# Patient Record
Sex: MALE | Race: BLACK OR AFRICAN AMERICAN | NOT HISPANIC OR LATINO | Employment: FULL TIME | URBAN - METROPOLITAN AREA
[De-identification: names, ages, dates, MRNs, and addresses within clinical notes are randomized per-mention and may not be internally consistent; named-entity substitution may affect disease eponyms.]

---

## 2017-07-28 ENCOUNTER — OFFICE VISIT (OUTPATIENT)
Dept: INTERNAL MEDICINE | Facility: CLINIC | Age: 42
End: 2017-07-28

## 2017-07-28 VITALS
WEIGHT: 267.25 LBS | HEIGHT: 71 IN | SYSTOLIC BLOOD PRESSURE: 124 MMHG | BODY MASS INDEX: 37.41 KG/M2 | HEART RATE: 64 BPM | RESPIRATION RATE: 16 BRPM | TEMPERATURE: 97.2 F | DIASTOLIC BLOOD PRESSURE: 70 MMHG

## 2017-07-28 DIAGNOSIS — Z00.00 WELL ADULT EXAM: Primary | ICD-10-CM

## 2017-07-28 DIAGNOSIS — Z13.220 LIPID SCREENING: ICD-10-CM

## 2017-07-28 DIAGNOSIS — Z12.5 SCREENING PSA (PROSTATE SPECIFIC ANTIGEN): ICD-10-CM

## 2017-07-28 DIAGNOSIS — J30.2 SEASONAL ALLERGIC RHINITIS, UNSPECIFIED ALLERGIC RHINITIS TRIGGER: ICD-10-CM

## 2017-07-28 DIAGNOSIS — J30.1 SEASONAL ALLERGIC RHINITIS DUE TO POLLEN: ICD-10-CM

## 2017-07-28 DIAGNOSIS — J45.20 MILD INTERMITTENT ASTHMA WITHOUT COMPLICATION: ICD-10-CM

## 2017-07-28 LAB
ALBUMIN SERPL-MCNC: 4.6 G/DL (ref 3.2–4.8)
ALBUMIN/GLOB SERPL: 1.5 G/DL (ref 1.5–2.5)
ALP SERPL-CCNC: 90 U/L (ref 25–100)
ALT SERPL W P-5'-P-CCNC: 39 U/L (ref 7–40)
ANION GAP SERPL CALCULATED.3IONS-SCNC: 4 MMOL/L (ref 3–11)
ARTICHOKE IGE QN: 107 MG/DL (ref 0–130)
AST SERPL-CCNC: 22 U/L (ref 0–33)
BILIRUB SERPL-MCNC: 0.6 MG/DL (ref 0.3–1.2)
BUN BLD-MCNC: 14 MG/DL (ref 9–23)
BUN/CREAT SERPL: 14 (ref 7–25)
CALCIUM SPEC-SCNC: 9.3 MG/DL (ref 8.7–10.4)
CHLORIDE SERPL-SCNC: 105 MMOL/L (ref 99–109)
CHOLEST SERPL-MCNC: 165 MG/DL (ref 0–200)
CO2 SERPL-SCNC: 29 MMOL/L (ref 20–31)
CREAT BLD-MCNC: 1 MG/DL (ref 0.6–1.3)
DEPRECATED RDW RBC AUTO: 43.4 FL (ref 37–54)
ERYTHROCYTE [DISTWIDTH] IN BLOOD BY AUTOMATED COUNT: 13.1 % (ref 11.3–14.5)
GFR SERPL CREATININE-BSD FRML MDRD: 98 ML/MIN/1.73
GLOBULIN UR ELPH-MCNC: 3.1 GM/DL
GLUCOSE BLD-MCNC: 94 MG/DL (ref 70–100)
HCT VFR BLD AUTO: 46 % (ref 38.9–50.9)
HDLC SERPL-MCNC: 38 MG/DL (ref 40–60)
HGB BLD-MCNC: 15 G/DL (ref 13.1–17.5)
MCH RBC QN AUTO: 29.4 PG (ref 27–31)
MCHC RBC AUTO-ENTMCNC: 32.6 G/DL (ref 32–36)
MCV RBC AUTO: 90 FL (ref 80–99)
PLATELET # BLD AUTO: 203 10*3/MM3 (ref 150–450)
PMV BLD AUTO: 9.7 FL (ref 6–12)
POTASSIUM BLD-SCNC: 4.1 MMOL/L (ref 3.5–5.5)
PROT SERPL-MCNC: 7.7 G/DL (ref 5.7–8.2)
PSA SERPL-MCNC: 0.56 NG/ML (ref 0–4)
RBC # BLD AUTO: 5.11 10*6/MM3 (ref 4.2–5.76)
SODIUM BLD-SCNC: 138 MMOL/L (ref 132–146)
T4 FREE SERPL-MCNC: 0.98 NG/DL (ref 0.89–1.76)
TRIGL SERPL-MCNC: 116 MG/DL (ref 0–150)
TSH SERPL DL<=0.05 MIU/L-ACNC: 2 MIU/ML (ref 0.35–5.35)
WBC NRBC COR # BLD: 5.48 10*3/MM3 (ref 3.5–10.8)

## 2017-07-28 PROCEDURE — 36415 COLL VENOUS BLD VENIPUNCTURE: CPT | Performed by: INTERNAL MEDICINE

## 2017-07-28 PROCEDURE — 99396 PREV VISIT EST AGE 40-64: CPT | Performed by: INTERNAL MEDICINE

## 2017-07-28 PROCEDURE — 80053 COMPREHEN METABOLIC PANEL: CPT | Performed by: INTERNAL MEDICINE

## 2017-07-28 PROCEDURE — 84439 ASSAY OF FREE THYROXINE: CPT | Performed by: INTERNAL MEDICINE

## 2017-07-28 PROCEDURE — 84443 ASSAY THYROID STIM HORMONE: CPT | Performed by: INTERNAL MEDICINE

## 2017-07-28 PROCEDURE — 80061 LIPID PANEL: CPT | Performed by: INTERNAL MEDICINE

## 2017-07-28 PROCEDURE — 84153 ASSAY OF PSA TOTAL: CPT | Performed by: INTERNAL MEDICINE

## 2017-07-28 PROCEDURE — 85027 COMPLETE CBC AUTOMATED: CPT | Performed by: INTERNAL MEDICINE

## 2017-07-28 RX ORDER — BUDESONIDE AND FORMOTEROL FUMARATE DIHYDRATE 160; 4.5 UG/1; UG/1
2 AEROSOL RESPIRATORY (INHALATION) 2 TIMES DAILY
Qty: 1 INHALER | Refills: 6 | Status: SHIPPED | OUTPATIENT
Start: 2017-07-28 | End: 2019-10-09 | Stop reason: SDUPTHER

## 2017-07-28 RX ORDER — FLUTICASONE PROPIONATE 50 MCG
2 SPRAY, SUSPENSION (ML) NASAL DAILY
Qty: 1 EACH | Refills: 5 | Status: SHIPPED | OUTPATIENT
Start: 2017-07-28 | End: 2019-10-09 | Stop reason: SDUPTHER

## 2017-07-28 RX ORDER — ALBUTEROL SULFATE 90 UG/1
2 AEROSOL, METERED RESPIRATORY (INHALATION) EVERY 4 HOURS PRN
Qty: 1 INHALER | Refills: 3 | Status: SHIPPED | OUTPATIENT
Start: 2017-07-28 | End: 2019-10-09 | Stop reason: SDUPTHER

## 2017-07-28 RX ORDER — MONTELUKAST SODIUM 10 MG/1
10 TABLET ORAL DAILY
Qty: 30 TABLET | Refills: 6 | Status: SHIPPED | OUTPATIENT
Start: 2017-07-28 | End: 2019-10-09 | Stop reason: SDUPTHER

## 2017-07-28 NOTE — PROGRESS NOTES
Subjective   Pj Zuleta is a 42 y.o. male.     History of Present Illness     Complete Physical   1 asthma-chronic and controlled.  Patient says that he has been doing very well in regards to his asthma.  No reports of any exercise intolerance, dyspnea on exertion, shortness breath, wheezing, coughing, or any other systemic symptoms.    2 seasonal allergies- chronic and controlled.  Patient says that his seasonal allergies have been well-controlled since being on the Singulair and Flonase r    Diet: Regular    Exercise minimal    Social History no EtOH consumption, no serious smoking, no IV drug use, no marijuana.      Review of Systems   All other systems reviewed and are negative.      Objective   Physical Exam   Constitutional: He is oriented to person, place, and time. He appears well-developed and well-nourished.   HENT:   Head: Normocephalic.   Right Ear: External ear normal.   Left Ear: External ear normal.   Nose: Nose normal.   Mouth/Throat: Oropharynx is clear and moist.   Eyes: Conjunctivae and EOM are normal. Pupils are equal, round, and reactive to light.   Neck: Normal range of motion. Neck supple.   Cardiovascular: Normal rate, regular rhythm and normal heart sounds.    Pulmonary/Chest: Effort normal and breath sounds normal.   Abdominal: Soft.   Genitourinary: Rectum normal, prostate normal and penis normal.   Musculoskeletal: Normal range of motion.   Neurological: He is alert and oriented to person, place, and time. He has normal reflexes.   Skin: Skin is warm.   Psychiatric: He has a normal mood and affect. His behavior is normal. Judgment and thought content normal.   Nursing note and vitals reviewed.      Assessment/Plan   Pj was seen today for annual exam, asthma and allergies.    Diagnoses and all orders for this visit:    Well adult exam  -     CBC (No Diff)  -     Comprehensive Metabolic Panel  -     T4, Free  -     TSH    Lipid screening  -     Lipid Panel    Mild intermittent  asthma without complication  -     budesonide-formoterol (SYMBICORT) 160-4.5 MCG/ACT inhaler; Inhale 2 puffs 2 (Two) Times a Day.  -     albuterol (PROVENTIL HFA;VENTOLIN HFA) 108 (90 BASE) MCG/ACT inhaler; Inhale 2 puffs Every 4 (Four) Hours As Needed for Wheezing.    Seasonal allergic rhinitis, unspecified allergic rhinitis trigger    Seasonal allergic rhinitis due to pollen  -     montelukast (SINGULAIR) 10 MG tablet; Take 1 tablet by mouth Daily.  -     fluticasone (FLONASE) 50 MCG/ACT nasal spray; 2 sprays into each nostril Daily.    Screening PSA (prostate specific antigen)  -     PSA    Continue on current inhaler therapy for asthma.  Continue on current medication therapy for seasonal allergies.      Anticipatory guidance:  Colonoscopy for colon cancer screening starting at age 45 (new recommendations for -American patients)  Recommend routine ophthalmology exam.  Recommend routine dental visits.

## 2018-05-03 ENCOUNTER — HOSPITAL ENCOUNTER (EMERGENCY)
Facility: HOSPITAL | Age: 43
Discharge: HOME | End: 2018-05-03
Attending: EMERGENCY MEDICINE | Admitting: EMERGENCY MEDICINE

## 2018-05-03 ENCOUNTER — APPOINTMENT (OUTPATIENT)
Dept: GENERAL RADIOLOGY | Facility: HOSPITAL | Age: 43
End: 2018-05-03

## 2018-05-03 VITALS
DIASTOLIC BLOOD PRESSURE: 77 MMHG | RESPIRATION RATE: 18 BRPM | HEIGHT: 72 IN | HEART RATE: 92 BPM | WEIGHT: 260 LBS | OXYGEN SATURATION: 96 % | SYSTOLIC BLOOD PRESSURE: 139 MMHG | TEMPERATURE: 99.9 F | BODY MASS INDEX: 35.21 KG/M2

## 2018-05-03 DIAGNOSIS — R50.9 FEVER, UNSPECIFIED FEVER CAUSE: ICD-10-CM

## 2018-05-03 DIAGNOSIS — J06.9 UPPER RESPIRATORY TRACT INFECTION, UNSPECIFIED TYPE: Primary | ICD-10-CM

## 2018-05-03 DIAGNOSIS — R11.2 NAUSEA AND VOMITING, INTRACTABILITY OF VOMITING NOT SPECIFIED, UNSPECIFIED VOMITING TYPE: ICD-10-CM

## 2018-05-03 DIAGNOSIS — J11.1 FLU SYNDROME: ICD-10-CM

## 2018-05-03 LAB
ALBUMIN SERPL-MCNC: 4.5 G/DL (ref 3.2–4.8)
ALBUMIN/GLOB SERPL: 1.4 G/DL (ref 1.5–2.5)
ALP SERPL-CCNC: 99 U/L (ref 25–100)
ALT SERPL W P-5'-P-CCNC: 49 U/L (ref 7–40)
ANION GAP SERPL CALCULATED.3IONS-SCNC: 8 MMOL/L (ref 3–11)
AST SERPL-CCNC: 25 U/L (ref 0–33)
BACTERIA UR QL AUTO: NORMAL /HPF
BASOPHILS # BLD AUTO: 0.02 10*3/MM3 (ref 0–0.2)
BASOPHILS NFR BLD AUTO: 0.3 % (ref 0–1)
BILIRUB SERPL-MCNC: 0.6 MG/DL (ref 0.3–1.2)
BILIRUB UR QL STRIP: NEGATIVE
BUN BLD-MCNC: 11 MG/DL (ref 9–23)
BUN/CREAT SERPL: 10 (ref 7–25)
CALCIUM SPEC-SCNC: 9 MG/DL (ref 8.7–10.4)
CHLORIDE SERPL-SCNC: 100 MMOL/L (ref 99–109)
CLARITY UR: ABNORMAL
CO2 SERPL-SCNC: 26 MMOL/L (ref 20–31)
COLOR UR: YELLOW
CREAT BLD-MCNC: 1.1 MG/DL (ref 0.6–1.3)
D-LACTATE SERPL-SCNC: 1.3 MMOL/L (ref 0.5–2)
DEPRECATED RDW RBC AUTO: 41.3 FL (ref 37–54)
EOSINOPHIL # BLD AUTO: 0.02 10*3/MM3 (ref 0–0.3)
EOSINOPHIL NFR BLD AUTO: 0.3 % (ref 0–3)
ERYTHROCYTE [DISTWIDTH] IN BLOOD BY AUTOMATED COUNT: 12.7 % (ref 11.3–14.5)
FLUAV AG NPH QL: NEGATIVE
FLUBV AG NPH QL IA: NEGATIVE
GFR SERPL CREATININE-BSD FRML MDRD: 88 ML/MIN/1.73
GLOBULIN UR ELPH-MCNC: 3.3 GM/DL
GLUCOSE BLD-MCNC: 96 MG/DL (ref 70–100)
GLUCOSE UR STRIP-MCNC: NEGATIVE MG/DL
HCT VFR BLD AUTO: 45 % (ref 38.9–50.9)
HGB BLD-MCNC: 15.2 G/DL (ref 13.1–17.5)
HGB UR QL STRIP.AUTO: NEGATIVE
HYALINE CASTS UR QL AUTO: NORMAL /LPF
IMM GRANULOCYTES # BLD: 0.02 10*3/MM3 (ref 0–0.03)
IMM GRANULOCYTES NFR BLD: 0.3 % (ref 0–0.6)
KETONES UR QL STRIP: NEGATIVE
LEUKOCYTE ESTERASE UR QL STRIP.AUTO: NEGATIVE
LYMPHOCYTES # BLD AUTO: 1.18 10*3/MM3 (ref 0.6–4.8)
LYMPHOCYTES NFR BLD AUTO: 18.8 % (ref 24–44)
MCH RBC QN AUTO: 29.7 PG (ref 27–31)
MCHC RBC AUTO-ENTMCNC: 33.8 G/DL (ref 32–36)
MCV RBC AUTO: 88.1 FL (ref 80–99)
MONOCYTES # BLD AUTO: 0.83 10*3/MM3 (ref 0–1)
MONOCYTES NFR BLD AUTO: 13.2 % (ref 0–12)
NEUTROPHILS # BLD AUTO: 4.2 10*3/MM3 (ref 1.5–8.3)
NEUTROPHILS NFR BLD AUTO: 67.1 % (ref 41–71)
NITRITE UR QL STRIP: NEGATIVE
PH UR STRIP.AUTO: 8.5 [PH] (ref 5–8)
PLATELET # BLD AUTO: 171 10*3/MM3 (ref 150–450)
PMV BLD AUTO: 9.4 FL (ref 6–12)
POTASSIUM BLD-SCNC: 3.9 MMOL/L (ref 3.5–5.5)
PROT SERPL-MCNC: 7.8 G/DL (ref 5.7–8.2)
PROT UR QL STRIP: NEGATIVE
RBC # BLD AUTO: 5.11 10*6/MM3 (ref 4.2–5.76)
RBC # UR: NORMAL /HPF
REF LAB TEST METHOD: NORMAL
SODIUM BLD-SCNC: 134 MMOL/L (ref 132–146)
SP GR UR STRIP: 1.02 (ref 1–1.03)
SQUAMOUS #/AREA URNS HPF: NORMAL /HPF
UROBILINOGEN UR QL STRIP: ABNORMAL
WBC NRBC COR # BLD: 6.27 10*3/MM3 (ref 3.5–10.8)
WBC UR QL AUTO: NORMAL /HPF

## 2018-05-03 PROCEDURE — 81001 URINALYSIS AUTO W/SCOPE: CPT | Performed by: PHYSICIAN ASSISTANT

## 2018-05-03 PROCEDURE — 83605 ASSAY OF LACTIC ACID: CPT | Performed by: PHYSICIAN ASSISTANT

## 2018-05-03 PROCEDURE — 87040 BLOOD CULTURE FOR BACTERIA: CPT | Performed by: PHYSICIAN ASSISTANT

## 2018-05-03 PROCEDURE — 96367 TX/PROPH/DG ADDL SEQ IV INF: CPT

## 2018-05-03 PROCEDURE — 94640 AIRWAY INHALATION TREATMENT: CPT

## 2018-05-03 PROCEDURE — 25010000002 AZITHROMYCIN: Performed by: PHYSICIAN ASSISTANT

## 2018-05-03 PROCEDURE — 71046 X-RAY EXAM CHEST 2 VIEWS: CPT

## 2018-05-03 PROCEDURE — 25010000002 CEFTRIAXONE PER 250 MG: Performed by: PHYSICIAN ASSISTANT

## 2018-05-03 PROCEDURE — 96361 HYDRATE IV INFUSION ADD-ON: CPT

## 2018-05-03 PROCEDURE — 94760 N-INVAS EAR/PLS OXIMETRY 1: CPT

## 2018-05-03 PROCEDURE — 80053 COMPREHEN METABOLIC PANEL: CPT | Performed by: PHYSICIAN ASSISTANT

## 2018-05-03 PROCEDURE — 85025 COMPLETE CBC W/AUTO DIFF WBC: CPT | Performed by: PHYSICIAN ASSISTANT

## 2018-05-03 PROCEDURE — 96365 THER/PROPH/DIAG IV INF INIT: CPT

## 2018-05-03 PROCEDURE — 93005 ELECTROCARDIOGRAM TRACING: CPT | Performed by: EMERGENCY MEDICINE

## 2018-05-03 PROCEDURE — 93005 ELECTROCARDIOGRAM TRACING: CPT

## 2018-05-03 PROCEDURE — 87804 INFLUENZA ASSAY W/OPTIC: CPT | Performed by: PHYSICIAN ASSISTANT

## 2018-05-03 PROCEDURE — 94799 UNLISTED PULMONARY SVC/PX: CPT

## 2018-05-03 PROCEDURE — 99284 EMERGENCY DEPT VISIT MOD MDM: CPT

## 2018-05-03 RX ORDER — ALBUTEROL SULFATE 90 UG/1
2 AEROSOL, METERED RESPIRATORY (INHALATION) EVERY 4 HOURS PRN
Qty: 1 INHALER | Refills: 0 | Status: SHIPPED | OUTPATIENT
Start: 2018-05-03 | End: 2020-09-24

## 2018-05-03 RX ORDER — DEXTROMETHORPHAN HYDROBROMIDE AND PROMETHAZINE HYDROCHLORIDE 15; 6.25 MG/5ML; MG/5ML
5 SYRUP ORAL 4 TIMES DAILY PRN
Qty: 100 ML | Refills: 0 | Status: SHIPPED | OUTPATIENT
Start: 2018-05-03 | End: 2019-10-09

## 2018-05-03 RX ORDER — IPRATROPIUM BROMIDE AND ALBUTEROL SULFATE 2.5; .5 MG/3ML; MG/3ML
3 SOLUTION RESPIRATORY (INHALATION) ONCE
Status: COMPLETED | OUTPATIENT
Start: 2018-05-03 | End: 2018-05-03

## 2018-05-03 RX ORDER — CEFTRIAXONE SODIUM 1 G/50ML
1 INJECTION, SOLUTION INTRAVENOUS ONCE
Status: COMPLETED | OUTPATIENT
Start: 2018-05-03 | End: 2018-05-03

## 2018-05-03 RX ORDER — SODIUM CHLORIDE 0.9 % (FLUSH) 0.9 %
10 SYRINGE (ML) INJECTION AS NEEDED
Status: DISCONTINUED | OUTPATIENT
Start: 2018-05-03 | End: 2018-05-04 | Stop reason: HOSPADM

## 2018-05-03 RX ORDER — ALBUTEROL SULFATE 1.25 MG/3ML
1 SOLUTION RESPIRATORY (INHALATION) EVERY 6 HOURS PRN
Qty: 30 VIAL | Refills: 0 | Status: SHIPPED | OUTPATIENT
Start: 2018-05-03 | End: 2020-11-23 | Stop reason: SDUPTHER

## 2018-05-03 RX ORDER — AZITHROMYCIN 250 MG/1
TABLET, FILM COATED ORAL
Qty: 4 TABLET | Refills: 0 | Status: SHIPPED | OUTPATIENT
Start: 2018-05-03 | End: 2018-07-30

## 2018-05-03 RX ORDER — ACETAMINOPHEN 500 MG
1000 TABLET ORAL ONCE
Status: COMPLETED | OUTPATIENT
Start: 2018-05-03 | End: 2018-05-03

## 2018-05-03 RX ADMIN — ACETAMINOPHEN 1000 MG: 500 TABLET, FILM COATED ORAL at 20:25

## 2018-05-03 RX ADMIN — SODIUM CHLORIDE 1000 ML: 9 INJECTION, SOLUTION INTRAVENOUS at 20:24

## 2018-05-03 RX ADMIN — AZITHROMYCIN MONOHYDRATE 500 MG: 500 INJECTION, POWDER, LYOPHILIZED, FOR SOLUTION INTRAVENOUS at 21:59

## 2018-05-03 RX ADMIN — IPRATROPIUM BROMIDE AND ALBUTEROL SULFATE 3 ML: 2.5; .5 SOLUTION RESPIRATORY (INHALATION) at 21:38

## 2018-05-03 RX ADMIN — CEFTRIAXONE SODIUM 1 G: 1 INJECTION, SOLUTION INTRAVENOUS at 21:35

## 2018-05-03 NOTE — ED PROVIDER NOTES
Subjective     Illness   Location:  Per HPI  Quality:  Per HPI  Severity:  Moderate  Onset quality:  Sudden  Duration:  8 hours  Timing:  Intermittent  Progression:  Waxing and waning  Chronicity:  New  Context:  Per HPI  Relieved by:  Per HPI  Worsened by:  Nothing  Ineffective treatments:  Nothing  Associated symptoms: congestion, cough, fatigue, fever, headaches, myalgias, nausea, sore throat and vomiting    Associated symptoms: no abdominal pain, no chest pain, no diarrhea, no ear pain, no loss of consciousness, no rash, no rhinorrhea, no shortness of breath and no wheezing      3-year-old male presents to emergency department complaining of onset this morning of chills sweats nausea vomiting, tightness in the chest with cough and wheeze.  He states he spiked a fever earlier this afternoon, took 1 g of Tylenol and went home from his job as a .  He denies stiff neck vision changes or photophobia.  No sputum or hemoptysis.  No back pain or chest pain.  No abdominal pain flank pain dysuria hematuria pyuria.  No rash.    Past medical history is remarkable for asthma.  Medication list reviewed.  He denies drug allergies.  Review of Systems   Constitutional: Positive for fatigue and fever.   HENT: Positive for congestion and sore throat. Negative for ear pain and rhinorrhea.    Respiratory: Positive for cough. Negative for shortness of breath and wheezing.    Cardiovascular: Negative for chest pain.   Gastrointestinal: Positive for nausea and vomiting. Negative for abdominal pain and diarrhea.   Musculoskeletal: Positive for myalgias.   Skin: Negative for rash.   Neurological: Positive for headaches. Negative for loss of consciousness.   All other systems reviewed and are negative.      Past Medical History:   Diagnosis Date   • Asthma        No Known Allergies    History reviewed. No pertinent surgical history.    Family History   Problem Relation Age of Onset   • Heart attack Father    •  Hypertension Father    • Lung cancer Father    • Alzheimer's disease Father    • Alcohol abuse Father        Social History     Social History   • Marital status: Single     Social History Main Topics   • Smoking status: Never Smoker   • Smokeless tobacco: Never Used   • Alcohol use Yes      Comment: social   • Drug use: Unknown     Other Topics Concern   • Not on file           Objective   Physical Exam   Constitutional: He is oriented to person, place, and time. He appears well-developed and well-nourished. No distress.   Alert oriented not toxic appearing afebrile.  Does not clinically appear to be dehydrated.  Oral temp 2.4.   HENT:   Head: Normocephalic and atraumatic.   Right Ear: External ear normal.   Left Ear: External ear normal.   Nose: Nose normal.   Mouth/Throat: Oropharynx is clear and moist. No oropharyngeal exudate.   Eyes: Conjunctivae and EOM are normal. Pupils are equal, round, and reactive to light. Right eye exhibits no discharge. Left eye exhibits no discharge. No scleral icterus.   Neck: Normal range of motion. Neck supple. No JVD present. No tracheal deviation present. No thyromegaly present.   Cardiovascular: Normal rate, regular rhythm, normal heart sounds and intact distal pulses.  Exam reveals no friction rub.    No murmur heard.  Pulmonary/Chest: Effort normal and breath sounds normal. No stridor. No respiratory distress. He has no wheezes. He has no rales. He exhibits no tenderness.   Abdominal: Soft. He exhibits no distension and no mass. There is no tenderness. There is no rebound and no guarding. No hernia.   Musculoskeletal: Normal range of motion. He exhibits no edema, tenderness or deformity.   Lymphadenopathy:     He has no cervical adenopathy.   Neurological: He is alert and oriented to person, place, and time. No cranial nerve deficit. He exhibits normal muscle tone. Coordination normal.   Skin: Skin is warm and dry. No rash noted. He is not diaphoretic. No erythema. No pallor.    Psychiatric: He has a normal mood and affect. His behavior is normal. Judgment and thought content normal.   Nursing note and vitals reviewed.      Procedures        Recent Results (from the past 24 hour(s))   Comprehensive Metabolic Panel    Collection Time: 05/03/18  8:17 PM   Result Value Ref Range    Glucose 96 70 - 100 mg/dL    BUN 11 9 - 23 mg/dL    Creatinine 1.10 0.60 - 1.30 mg/dL    Sodium 134 132 - 146 mmol/L    Potassium 3.9 3.5 - 5.5 mmol/L    Chloride 100 99 - 109 mmol/L    CO2 26.0 20.0 - 31.0 mmol/L    Calcium 9.0 8.7 - 10.4 mg/dL    Total Protein 7.8 5.7 - 8.2 g/dL    Albumin 4.50 3.20 - 4.80 g/dL    ALT (SGPT) 49 (H) 7 - 40 U/L    AST (SGOT) 25 0 - 33 U/L    Alkaline Phosphatase 99 25 - 100 U/L    Total Bilirubin 0.6 0.3 - 1.2 mg/dL    eGFR  African Amer 88 >60 mL/min/1.73    Globulin 3.3 gm/dL    A/G Ratio 1.4 (L) 1.5 - 2.5 g/dL    BUN/Creatinine Ratio 10.0 7.0 - 25.0    Anion Gap 8.0 3.0 - 11.0 mmol/L   Lactic Acid, Plasma    Collection Time: 05/03/18  8:17 PM   Result Value Ref Range    Lactate 1.3 0.5 - 2.0 mmol/L   CBC Auto Differential    Collection Time: 05/03/18  8:17 PM   Result Value Ref Range    WBC 6.27 3.50 - 10.80 10*3/mm3    RBC 5.11 4.20 - 5.76 10*6/mm3    Hemoglobin 15.2 13.1 - 17.5 g/dL    Hematocrit 45.0 38.9 - 50.9 %    MCV 88.1 80.0 - 99.0 fL    MCH 29.7 27.0 - 31.0 pg    MCHC 33.8 32.0 - 36.0 g/dL    RDW 12.7 11.3 - 14.5 %    RDW-SD 41.3 37.0 - 54.0 fl    MPV 9.4 6.0 - 12.0 fL    Platelets 171 150 - 450 10*3/mm3    Neutrophil % 67.1 41.0 - 71.0 %    Lymphocyte % 18.8 (L) 24.0 - 44.0 %    Monocyte % 13.2 (H) 0.0 - 12.0 %    Eosinophil % 0.3 0.0 - 3.0 %    Basophil % 0.3 0.0 - 1.0 %    Immature Grans % 0.3 0.0 - 0.6 %    Neutrophils, Absolute 4.20 1.50 - 8.30 10*3/mm3    Lymphocytes, Absolute 1.18 0.60 - 4.80 10*3/mm3    Monocytes, Absolute 0.83 0.00 - 1.00 10*3/mm3    Eosinophils, Absolute 0.02 0.00 - 0.30 10*3/mm3    Basophils, Absolute 0.02 0.00 - 0.20 10*3/mm3    Immature  Grans, Absolute 0.02 0.00 - 0.03 10*3/mm3   Urinalysis With / Microscopic If Indicated - Urine, Clean Catch    Collection Time: 05/03/18  8:27 PM   Result Value Ref Range    Color, UA Yellow Yellow, Straw    Appearance, UA Turbid (A) Clear    pH, UA 8.5 (H) 5.0 - 8.0    Specific Gravity, UA 1.017 1.001 - 1.030    Glucose, UA Negative Negative    Ketones, UA Negative Negative    Bilirubin, UA Negative Negative    Blood, UA Negative Negative    Protein, UA Negative Negative    Leuk Esterase, UA Negative Negative    Nitrite, UA Negative Negative    Urobilinogen, UA 0.2 E.U./dL 0.2 - 1.0 E.U./dL   Urinalysis, Microscopic Only - Urine, Clean Catch    Collection Time: 05/03/18  8:27 PM   Result Value Ref Range    RBC, UA 0-2 None Seen, 0-2 /HPF    WBC, UA 0-2 None Seen, 0-2 /HPF    Bacteria, UA None Seen None Seen, Trace /HPF    Squamous Epithelial Cells, UA 0-2 None Seen, 0-2 /HPF    Hyaline Casts, UA None Seen 0 - 6 /LPF    Methodology Automated Microscopy    Influenza Antigen, Rapid - Swab, Nasopharynx    Collection Time: 05/03/18  8:28 PM   Result Value Ref Range    Influenza A Ag, EIA Negative Negative    Influenza B Ag, EIA Negative Negative     Note: In addition to lab results from this visit, the labs listed above may include labs taken at another facility or during a different encounter within the last 24 hours. Please correlate lab times with ED admission and discharge times for further clarification of the services performed during this visit.    XR Chest PA & Lateral   ED Interpretation     Findings suspicious for bronchitis without definite evidence of pneumonia.     Recommend clinical correlation for active pulmonary infection.         THIS DOCUMENT HAS BEEN ELECTRONICALLY SIGNED BY CHAU GRAFF MD      Final Result     Findings suspicious for bronchitis without definite evidence of pneumonia.     Recommend clinical correlation for active pulmonary infection.         THIS DOCUMENT HAS BEEN ELECTRONICALLY  "SIGNED BY CHAU GRAFF MD        Vitals:    05/03/18 1913 05/03/18 2020 05/03/18 2136 05/03/18 2138   BP: 165/93  133/77    BP Location: Left arm      Patient Position: Sitting      Pulse: 94  91 87   Resp: 16   18   Temp: (!) 101.4 °F (38.6 °C) (!) 102.4 °F (39.1 °C) 99.9 °F (37.7 °C)    TempSrc: Oral Oral Oral    SpO2: 98%  97%    Weight: 118 kg (260 lb)      Height: 182.9 cm (72\")        Medications   sodium chloride 0.9 % flush 10 mL (not administered)   cefTRIAXone (ROCEPHIN) IVPB 1 g (1 g Intravenous New Bag 5/3/18 2135)   AZITHROMYCIN 500 MG/250 ML 0.9% NS IVPB (MBP) (not administered)   sodium chloride 0.9 % bolus 1,000 mL (0 mL Intravenous Stopped 5/3/18 2127)   acetaminophen (TYLENOL) tablet 1,000 mg (1,000 mg Oral Given 5/3/18 2025)   ipratropium-albuterol (DUO-NEB) nebulizer solution 3 mL (3 mL Nebulization Given 5/3/18 2138)     ECG/EMG Results (last 24 hours)     Procedure Component Value Units Date/Time    ECG 12 Lead [[id number]] Collected:  05/03/18 1917     Updated:  05/03/18 1918              ED Course  ED Course   Value Comment By Time   WBC: 6.27 (Reviewed) Dexter Powers PA-C 05/03 2146   Lactate, Venous: 1.3 (Reviewed) Dexter Powers PA-C 05/03 2146    Patient remained stable on serial rechecks.  Recheck at 2146, feeling better, doing DuoNeb treatment.  Will discharge to home with azithromycin daily for 4 more days, albuterol inhaler, Phenergan DM cough medication.  He has a nebulizer machine at home, we will prescribe albuterol 4 times daily as needed.  Follow-up with Dr. Chiang for recheck on Monday, return sooner if any change or worsening.  Alternate Tylenol and ibuprofen every 3 hours for control fever and body aches.  Patient verbalizes understanding and is agreeable to plan. Dexter Powers PA-C 05/03 2146                  [facility]      Final diagnoses:   Upper respiratory tract infection, unspecified type   Flu syndrome   Fever, unspecified fever cause   Nausea and vomiting, " intractability of vomiting not specified, unspecified vomiting type            Dexter Powers PA-C  05/03/18 2351

## 2018-05-04 ENCOUNTER — TELEPHONE (OUTPATIENT)
Dept: INTERNAL MEDICINE | Facility: CLINIC | Age: 43
End: 2018-05-04

## 2018-05-04 NOTE — DISCHARGE INSTRUCTIONS
Increase fluid intake.  Alternate Tylenol and ibuprofen every 3 hours to control fever and body aches.  Follow-up with Dr. Chiang for recheck on Monday.  Return to emergency department immediately if any change or worsening of symptoms.

## 2018-05-07 ENCOUNTER — OFFICE VISIT (OUTPATIENT)
Dept: INTERNAL MEDICINE | Facility: CLINIC | Age: 43
End: 2018-05-07

## 2018-05-07 VITALS
WEIGHT: 277 LBS | TEMPERATURE: 97.6 F | BODY MASS INDEX: 37.57 KG/M2 | SYSTOLIC BLOOD PRESSURE: 118 MMHG | RESPIRATION RATE: 20 BRPM | HEART RATE: 68 BPM | DIASTOLIC BLOOD PRESSURE: 70 MMHG

## 2018-05-07 DIAGNOSIS — G47.33 OSA (OBSTRUCTIVE SLEEP APNEA): ICD-10-CM

## 2018-05-07 DIAGNOSIS — B34.9 VIRAL ILLNESS: Primary | ICD-10-CM

## 2018-05-07 PROCEDURE — 99213 OFFICE O/P EST LOW 20 MIN: CPT | Performed by: INTERNAL MEDICINE

## 2018-05-07 NOTE — PROGRESS NOTES
Subjective   Pj Zuleta is a 43 y.o. male.     History of Present Illness     Vomiting, fatigue, and mild dehydration   Patient presented with a 48 hour history with the previous.  Symptoms as previously mentioned.  Patient says that he now feels better and has no symptoms of any nausea, no vomiting, diarrhea, no other systemic symptoms.    2 YONY -chronic.  Patient needs to reschedule his sleep study for his obstructive sleep apnea evaluation.    Review of Systems   All other systems reviewed and are negative.      Objective   Physical Exam   Constitutional: He is oriented to person, place, and time. He appears well-developed and well-nourished.   HENT:   Head: Normocephalic.   Right Ear: External ear normal.   Left Ear: External ear normal.   Nose: Nose normal.   Mouth/Throat: Oropharynx is clear and moist.   Eyes: Conjunctivae and EOM are normal. Pupils are equal, round, and reactive to light.   Neck: Normal range of motion. Neck supple.   Cardiovascular: Normal rate, regular rhythm, normal heart sounds and intact distal pulses.    Pulmonary/Chest: Effort normal and breath sounds normal.   Musculoskeletal: Normal range of motion.   Neurological: He is alert and oriented to person, place, and time. He has normal reflexes.   Nursing note and vitals reviewed.        Assessment/Plan   Pj was seen today for follow-up.    Diagnoses and all orders for this visit:    Viral illness  Supportive care  Advance diet as tolerated with emphasis on hydration.  Monitor for signs for dehydration.  Continue with Tylenol and or Motrin for fever reduction and or pain control.  Return to clinic if symptoms do not improve.    YONY (obstructive sleep apnea)-patient will follow-up with his sleep study

## 2018-05-08 LAB
BACTERIA SPEC AEROBE CULT: NORMAL
BACTERIA SPEC AEROBE CULT: NORMAL

## 2018-07-30 ENCOUNTER — OFFICE VISIT (OUTPATIENT)
Dept: INTERNAL MEDICINE | Facility: CLINIC | Age: 43
End: 2018-07-30

## 2018-07-30 VITALS
HEIGHT: 71 IN | RESPIRATION RATE: 18 BRPM | WEIGHT: 269 LBS | SYSTOLIC BLOOD PRESSURE: 122 MMHG | BODY MASS INDEX: 37.66 KG/M2 | HEART RATE: 64 BPM | TEMPERATURE: 96.3 F | DIASTOLIC BLOOD PRESSURE: 82 MMHG

## 2018-07-30 DIAGNOSIS — Z12.11 COLON CANCER SCREENING: ICD-10-CM

## 2018-07-30 DIAGNOSIS — Z12.5 SCREENING PSA (PROSTATE SPECIFIC ANTIGEN): ICD-10-CM

## 2018-07-30 DIAGNOSIS — J45.20 MILD INTERMITTENT ASTHMA WITHOUT COMPLICATION: ICD-10-CM

## 2018-07-30 DIAGNOSIS — Z13.220 LIPID SCREENING: ICD-10-CM

## 2018-07-30 DIAGNOSIS — Z30.09 VASECTOMY EVALUATION: ICD-10-CM

## 2018-07-30 DIAGNOSIS — Z00.00 WELL ADULT EXAM: Primary | ICD-10-CM

## 2018-07-30 LAB
ALBUMIN SERPL-MCNC: 4.5 G/DL (ref 3.2–4.8)
ALBUMIN/GLOB SERPL: 1.6 G/DL (ref 1.5–2.5)
ALP SERPL-CCNC: 96 U/L (ref 25–100)
ALT SERPL W P-5'-P-CCNC: 35 U/L (ref 7–40)
ANION GAP SERPL CALCULATED.3IONS-SCNC: 5 MMOL/L (ref 3–11)
ARTICHOKE IGE QN: 115 MG/DL (ref 0–130)
AST SERPL-CCNC: 19 U/L (ref 0–33)
BILIRUB SERPL-MCNC: 0.6 MG/DL (ref 0.3–1.2)
BUN BLD-MCNC: 13 MG/DL (ref 9–23)
BUN/CREAT SERPL: 12.7 (ref 7–25)
CALCIUM SPEC-SCNC: 9.4 MG/DL (ref 8.7–10.4)
CHLORIDE SERPL-SCNC: 107 MMOL/L (ref 99–109)
CHOLEST SERPL-MCNC: 162 MG/DL (ref 0–200)
CO2 SERPL-SCNC: 31 MMOL/L (ref 20–31)
CREAT BLD-MCNC: 1.02 MG/DL (ref 0.6–1.3)
DEPRECATED RDW RBC AUTO: 44.4 FL (ref 37–54)
ERYTHROCYTE [DISTWIDTH] IN BLOOD BY AUTOMATED COUNT: 13.2 % (ref 11.3–14.5)
GFR SERPL CREATININE-BSD FRML MDRD: 96 ML/MIN/1.73
GLOBULIN UR ELPH-MCNC: 2.9 GM/DL
GLUCOSE BLD-MCNC: 98 MG/DL (ref 70–100)
HCT VFR BLD AUTO: 46.6 % (ref 38.9–50.9)
HDLC SERPL-MCNC: 39 MG/DL (ref 40–60)
HGB BLD-MCNC: 15.2 G/DL (ref 13.1–17.5)
MCH RBC QN AUTO: 29.9 PG (ref 27–31)
MCHC RBC AUTO-ENTMCNC: 32.6 G/DL (ref 32–36)
MCV RBC AUTO: 91.7 FL (ref 80–99)
PLATELET # BLD AUTO: 195 10*3/MM3 (ref 150–450)
PMV BLD AUTO: 10.2 FL (ref 6–12)
POTASSIUM BLD-SCNC: 4.3 MMOL/L (ref 3.5–5.5)
PROT SERPL-MCNC: 7.4 G/DL (ref 5.7–8.2)
PSA SERPL-MCNC: 0.62 NG/ML (ref 0–4)
RBC # BLD AUTO: 5.08 10*6/MM3 (ref 4.2–5.76)
SODIUM BLD-SCNC: 143 MMOL/L (ref 132–146)
T4 FREE SERPL-MCNC: 1.04 NG/DL (ref 0.89–1.76)
TRIGL SERPL-MCNC: 130 MG/DL (ref 0–150)
TSH SERPL DL<=0.05 MIU/L-ACNC: 2.57 MIU/ML (ref 0.35–5.35)
WBC NRBC COR # BLD: 5.16 10*3/MM3 (ref 3.5–10.8)

## 2018-07-30 PROCEDURE — 85027 COMPLETE CBC AUTOMATED: CPT | Performed by: INTERNAL MEDICINE

## 2018-07-30 PROCEDURE — 99396 PREV VISIT EST AGE 40-64: CPT | Performed by: INTERNAL MEDICINE

## 2018-07-30 PROCEDURE — 80053 COMPREHEN METABOLIC PANEL: CPT | Performed by: INTERNAL MEDICINE

## 2018-07-30 PROCEDURE — 84443 ASSAY THYROID STIM HORMONE: CPT | Performed by: INTERNAL MEDICINE

## 2018-07-30 PROCEDURE — 80061 LIPID PANEL: CPT | Performed by: INTERNAL MEDICINE

## 2018-07-30 PROCEDURE — G0103 PSA SCREENING: HCPCS | Performed by: INTERNAL MEDICINE

## 2018-07-30 PROCEDURE — 36415 COLL VENOUS BLD VENIPUNCTURE: CPT | Performed by: INTERNAL MEDICINE

## 2018-07-30 PROCEDURE — 84439 ASSAY OF FREE THYROXINE: CPT | Performed by: INTERNAL MEDICINE

## 2018-07-30 NOTE — PROGRESS NOTES
Subjective   Pj Zuleta is a 43 y.o. male.     History of Present Illness     Complete Physical     1 asthma- chronic  Triggers: seasonal allergie  Last ER visit several years ago  No history of intubation or ICU admission.  Patient denies any recent coughing, wheezing, nighttime symptoms, or any other systemic signs.    Diet: Regular diet    Exercise: Walks occasionally    Screening:  Colonoscopy is due-patient would like to have this scheduled -2nd week of September 14      Review of Systems   All other systems reviewed and are negative.      Objective   Physical Exam   Constitutional: He appears well-developed and well-nourished.   HENT:   Head: Normocephalic.   Right Ear: External ear normal.   Left Ear: External ear normal.   Nose: Nose normal.   Mouth/Throat: Oropharynx is clear and moist.   Eyes: Pupils are equal, round, and reactive to light. Conjunctivae and EOM are normal.   Neck: Normal range of motion. Neck supple.   Cardiovascular: Normal rate, regular rhythm and normal heart sounds.    Pulmonary/Chest: Effort normal and breath sounds normal.   Abdominal: Soft. Bowel sounds are normal.   Genitourinary: Penis normal.   Musculoskeletal: Normal range of motion.   Neurological: He is alert.   Skin: Skin is warm.   Psychiatric: He has a normal mood and affect. His behavior is normal. Judgment and thought content normal.   Nursing note and vitals reviewed.        Assessment/Plan   Pj was seen today for annual exam.    Diagnoses and all orders for this visit:    Well adult exam  -     CBC (No Diff)  -     Comprehensive Metabolic Panel  -     T4, Free  -     TSH    Mild intermittent asthma without complication    Lipid screening  -     Lipid Panel    Screening PSA (prostate specific antigen)  -     PSA SCREENING    Colon cancer screening  -     Ambulatory Referral For Screening Colonoscopy    Vasectomy evaluation  -     Ambulatory Referral to Urology    Anticipatory guidance:  Recommend routine  ophthalmology exam.  Recommend routine dental visits for dental screening.  Appropriate nutrition discussed.  Emphasis on exercise and healthy lifestyle

## 2018-09-05 DIAGNOSIS — Z12.11 SCREENING FOR COLON CANCER: Primary | ICD-10-CM

## 2018-09-14 ENCOUNTER — OUTSIDE FACILITY SERVICE (OUTPATIENT)
Dept: GASTROENTEROLOGY | Facility: CLINIC | Age: 43
End: 2018-09-14

## 2018-09-14 PROCEDURE — G0121 COLON CA SCRN NOT HI RSK IND: HCPCS | Performed by: INTERNAL MEDICINE

## 2019-02-18 ENCOUNTER — TELEPHONE (OUTPATIENT)
Dept: INTERNAL MEDICINE | Facility: CLINIC | Age: 44
End: 2019-02-18

## 2019-02-18 NOTE — TELEPHONE ENCOUNTER
----- Message from Jamia Ferraro sent at 2/18/2019  9:31 AM EST -----  JAIME CALLED REQUESTING AN RX FOR A FACE MASK FOR HIS SLEEP APNEA MACHINE. HE STATES HE WAS SCHEDULED TO SEE THE PROVIDER AT THE SLEEP CENTER, BUT HIS APPT WAS CANCELLED AND EXTENDED OUT 3 MORE MONTHS. PATIENT STATES HE NEEDS THE FACE MASK NOW.    PATIENT CAN BE CONTACTED BACK AT: [phone]    SCRIPT CAN BE SENT TO WHICH [facility] YOU REQUEST AND THING IS BEST, PER PATIENTS REQUEST.

## 2019-02-19 ENCOUNTER — TELEPHONE (OUTPATIENT)
Dept: INTERNAL MEDICINE | Facility: CLINIC | Age: 44
End: 2019-02-19

## 2019-02-19 NOTE — TELEPHONE ENCOUNTER
----- Message from Enriqueta Sullivan sent at 2/19/2019 10:56 AM EST -----  Contact: [facility]   MONO FROM [facility] CALLING FOR JAIME BURRELL WHO HAS A RX FOR A C-PAP MASK. THEY HAVE NOT SEEN THIS PT BEFORE SO THEY WILL NEED CLINICAL NOTES FROM LAST COUPLE MONTHS AND RESULTS FROM A SLEEP STUDY IF WE HAVE THAT.  [facility] PH: [phone]

## 2019-02-21 NOTE — TELEPHONE ENCOUNTER
Faxed Dr. Chiang's 7/30/18 and 5/7/18 progress notes and 12/11/14 sleep study to [facility] thru [facility].

## 2019-07-02 ENCOUNTER — TELEPHONE (OUTPATIENT)
Dept: INTERNAL MEDICINE | Facility: CLINIC | Age: 44
End: 2019-07-02

## 2019-07-02 DIAGNOSIS — G47.33 OSA (OBSTRUCTIVE SLEEP APNEA): Primary | ICD-10-CM

## 2019-07-02 NOTE — TELEPHONE ENCOUNTER
Please tell patient that the referral has been put in and ordered for CPAP machine and supplies will be faxed

## 2019-07-02 NOTE — TELEPHONE ENCOUNTER
----- Message from Jamia Ferraro sent at 7/2/2019  9:16 AM EDT -----  Pj called requesting a RX for a new C-pap machine, new C-pap mask, and to have the sleep study done, it will need to to states the pressure on the machine should be between 10 and 18cm. [phone]    Thank you.    [facility] fax: [phone]    Thank you.

## 2019-07-10 ENCOUNTER — TELEPHONE (OUTPATIENT)
Dept: INTERNAL MEDICINE | Facility: CLINIC | Age: 44
End: 2019-07-10

## 2019-07-10 DIAGNOSIS — G47.33 OSA (OBSTRUCTIVE SLEEP APNEA): Primary | ICD-10-CM

## 2019-07-10 NOTE — TELEPHONE ENCOUNTER
Spoke to Margot she stated that her respiratory therapists is currently out of office and she is not sure which one he wants. She is asking for both please.

## 2019-07-10 NOTE — TELEPHONE ENCOUNTER
----- Message from Bernardo Rios sent at 7/10/2019  9:01 AM EDT -----  Contact: LUCIO WITH [facility]  Lucio with [facility] called and stated that she needs sleep study faxed over.    Lucio can be reached at [phone]  Fax # is [phone]

## 2019-10-09 ENCOUNTER — OFFICE VISIT (OUTPATIENT)
Dept: INTERNAL MEDICINE | Facility: CLINIC | Age: 44
End: 2019-10-09

## 2019-10-09 VITALS
SYSTOLIC BLOOD PRESSURE: 132 MMHG | WEIGHT: 258 LBS | HEART RATE: 70 BPM | DIASTOLIC BLOOD PRESSURE: 80 MMHG | RESPIRATION RATE: 20 BRPM | BODY MASS INDEX: 34.95 KG/M2 | OXYGEN SATURATION: 99 % | HEIGHT: 72 IN | TEMPERATURE: 97.9 F

## 2019-10-09 DIAGNOSIS — J30.1 SEASONAL ALLERGIC RHINITIS DUE TO POLLEN: ICD-10-CM

## 2019-10-09 DIAGNOSIS — N52.9 ERECTILE DYSFUNCTION, UNSPECIFIED ERECTILE DYSFUNCTION TYPE: ICD-10-CM

## 2019-10-09 DIAGNOSIS — Z12.5 SCREENING PSA (PROSTATE SPECIFIC ANTIGEN): ICD-10-CM

## 2019-10-09 DIAGNOSIS — Z00.00 WELL ADULT EXAM: Primary | ICD-10-CM

## 2019-10-09 DIAGNOSIS — Z13.220 LIPID SCREENING: ICD-10-CM

## 2019-10-09 DIAGNOSIS — J45.20 MILD INTERMITTENT ASTHMA WITHOUT COMPLICATION: ICD-10-CM

## 2019-10-09 PROBLEM — G47.00 CANNOT SLEEP: Status: ACTIVE | Noted: 2019-10-09

## 2019-10-09 PROBLEM — B35.1 FUNGAL INFECTION OF TOENAIL: Status: ACTIVE | Noted: 2019-10-09

## 2019-10-09 PROBLEM — F41.9 ANXIETY: Status: ACTIVE | Noted: 2019-10-09

## 2019-10-09 PROBLEM — J45.909 AIRWAY HYPERREACTIVITY: Status: ACTIVE | Noted: 2019-10-09

## 2019-10-09 PROBLEM — IMO0001 BLUES: Status: ACTIVE | Noted: 2019-10-09

## 2019-10-09 PROBLEM — G47.61 PERIODIC LIMB MOVEMENT: Status: ACTIVE | Noted: 2019-10-09

## 2019-10-09 PROBLEM — G47.33 OBSTRUCTIVE APNEA: Status: ACTIVE | Noted: 2019-10-09

## 2019-10-09 PROBLEM — J30.9 ALLERGIC RHINITIS: Status: ACTIVE | Noted: 2019-10-09

## 2019-10-09 PROBLEM — R06.89 LOUD BREATHING DURING SLEEP: Status: ACTIVE | Noted: 2019-10-09

## 2019-10-09 PROCEDURE — 99396 PREV VISIT EST AGE 40-64: CPT | Performed by: INTERNAL MEDICINE

## 2019-10-09 PROCEDURE — 80053 COMPREHEN METABOLIC PANEL: CPT | Performed by: INTERNAL MEDICINE

## 2019-10-09 PROCEDURE — 80061 LIPID PANEL: CPT | Performed by: INTERNAL MEDICINE

## 2019-10-09 PROCEDURE — 85027 COMPLETE CBC AUTOMATED: CPT | Performed by: INTERNAL MEDICINE

## 2019-10-09 PROCEDURE — G0103 PSA SCREENING: HCPCS | Performed by: INTERNAL MEDICINE

## 2019-10-09 PROCEDURE — 84439 ASSAY OF FREE THYROXINE: CPT | Performed by: INTERNAL MEDICINE

## 2019-10-09 PROCEDURE — 84443 ASSAY THYROID STIM HORMONE: CPT | Performed by: INTERNAL MEDICINE

## 2019-10-09 RX ORDER — FLUTICASONE PROPIONATE 50 MCG
2 SPRAY, SUSPENSION (ML) NASAL DAILY
Qty: 1 BOTTLE | Refills: 3 | Status: SHIPPED | OUTPATIENT
Start: 2019-10-09 | End: 2020-11-23 | Stop reason: SDUPTHER

## 2019-10-09 RX ORDER — BUDESONIDE AND FORMOTEROL FUMARATE DIHYDRATE 160; 4.5 UG/1; UG/1
2 AEROSOL RESPIRATORY (INHALATION)
Qty: 1 INHALER | Refills: 6 | Status: SHIPPED | OUTPATIENT
Start: 2019-10-09 | End: 2020-11-23 | Stop reason: SDUPTHER

## 2019-10-09 RX ORDER — MONTELUKAST SODIUM 10 MG/1
10 TABLET ORAL DAILY
Qty: 30 TABLET | Refills: 6 | Status: SHIPPED | OUTPATIENT
Start: 2019-10-09 | End: 2020-11-23 | Stop reason: SDUPTHER

## 2019-10-09 RX ORDER — TADALAFIL 20 MG/1
TABLET ORAL
Qty: 4 TABLET | Refills: 5 | Status: SHIPPED | OUTPATIENT
Start: 2019-10-09 | End: 2021-06-22

## 2019-10-09 RX ORDER — ALBUTEROL SULFATE 90 UG/1
2 AEROSOL, METERED RESPIRATORY (INHALATION) EVERY 4 HOURS PRN
Qty: 1 INHALER | Refills: 3 | Status: SHIPPED | OUTPATIENT
Start: 2019-10-09 | End: 2020-11-23 | Stop reason: SDUPTHER

## 2019-10-09 NOTE — PROGRESS NOTES
Subjective   Pj Zuleta is a 44 y.o. male.     History of Present Illness     The following portions of the patient's history were reviewed and updated as appropriate: allergies, current medications, past family history, past medical history, past social history, past surgical history and problem list.        Complete Adult Physical    1 asthma- chronic and doing well patient has not had any recent exacerbations with his asthma, no coughing, no wheezing, no other systemic symptoms.  And only uses his albuterol as needed.    2 seasonal allergies seasonal allergies have been somewhat under control.  Intermittently having coughing, wheezing, watery eyes, overall responding very well to over-the-counter antihistamines.        Diet: Regular, and well-balanced fruits vegetables and meats.        Exercise minimal to none.        Social History no EtOH consumption, no IV drug use, no marijuana, no illicit drugs.        Preventative Screenings  Colonoscopy this year-normal        Immunizations: Patient will be receiving influenza vaccine          Review of Systems   Constitutional: Negative.    HENT: Negative.    Eyes: Negative.    Respiratory: Negative.    Cardiovascular: Negative.    Endocrine: Negative.    Genitourinary: Negative.    All other systems reviewed and are negative.      Objective   Physical Exam   Constitutional: He is oriented to person, place, and time. He appears well-developed and well-nourished.   HENT:   Head: Normocephalic.   Right Ear: External ear normal.   Left Ear: External ear normal.   Nose: Nose normal.   Mouth/Throat: Oropharynx is clear and moist.   Eyes: Conjunctivae and EOM are normal. Pupils are equal, round, and reactive to light.   Neck: Normal range of motion. Neck supple.   Cardiovascular: Normal rate, regular rhythm and normal heart sounds.   Pulmonary/Chest: Effort normal and breath sounds normal.   Abdominal: Soft. Bowel sounds are normal.   Genitourinary: Penis normal.    Musculoskeletal: Normal range of motion.   Neurological: He is alert and oriented to person, place, and time.   Skin: Skin is warm.   Nursing note and vitals reviewed.        Assessment/Plan   Pj was seen today for annual exam.    Diagnoses and all orders for this visit:    Well adult exam  -     CBC (No Diff)  -     Comprehensive Metabolic Panel  -     TSH  -     T4, Free    Lipid screening  -     Lipid Panel    Screening PSA (prostate specific antigen)  -     PSA Screen    Mild intermittent asthma without complication  -     albuterol sulfate  (90 Base) MCG/ACT inhaler; Inhale 2 puffs Every 4 (Four) Hours As Needed for Wheezing.  -     budesonide-formoterol (SYMBICORT) 160-4.5 MCG/ACT inhaler; Inhale 2 puffs 2 (Two) Times a Day.    Seasonal allergic rhinitis due to pollen  -     fluticasone (FLONASE) 50 MCG/ACT nasal spray; 2 sprays into the nostril(s) as directed by provider Daily.  -     montelukast (SINGULAIR) 10 MG tablet; Take 1 tablet by mouth Daily.    Erectile dysfunction, unspecified erectile dysfunction type  -     tadalafil (CIALIS) 20 MG tablet; Take 1 tablet at least 1-3 hours before sexual activity.    Anticipatory guidance:  Recommend routine dental visit.  Recommend routine ophthalmology visit.  Strongly encouraged to get into some routine of exercise

## 2019-10-10 LAB
ALBUMIN SERPL-MCNC: 4.8 G/DL (ref 3.5–5.2)
ALBUMIN/GLOB SERPL: 1.5 G/DL
ALP SERPL-CCNC: 80 U/L (ref 39–117)
ALT SERPL W P-5'-P-CCNC: 36 U/L (ref 1–41)
ANION GAP SERPL CALCULATED.3IONS-SCNC: 12.2 MMOL/L (ref 5–15)
AST SERPL-CCNC: 18 U/L (ref 1–40)
BILIRUB SERPL-MCNC: 0.3 MG/DL (ref 0.2–1.2)
BUN BLD-MCNC: 12 MG/DL (ref 6–20)
BUN/CREAT SERPL: 11.2 (ref 7–25)
CALCIUM SPEC-SCNC: 9.4 MG/DL (ref 8.6–10.5)
CHLORIDE SERPL-SCNC: 101 MMOL/L (ref 98–107)
CHOLEST SERPL-MCNC: 162 MG/DL (ref 0–200)
CO2 SERPL-SCNC: 28.8 MMOL/L (ref 22–29)
CREAT BLD-MCNC: 1.07 MG/DL (ref 0.76–1.27)
DEPRECATED RDW RBC AUTO: 44 FL (ref 37–54)
ERYTHROCYTE [DISTWIDTH] IN BLOOD BY AUTOMATED COUNT: 13.2 % (ref 12.3–15.4)
GFR SERPL CREATININE-BSD FRML MDRD: 90 ML/MIN/1.73
GLOBULIN UR ELPH-MCNC: 3.1 GM/DL
GLUCOSE BLD-MCNC: 78 MG/DL (ref 65–99)
HCT VFR BLD AUTO: 49.1 % (ref 37.5–51)
HDLC SERPL-MCNC: 40 MG/DL (ref 40–60)
HGB BLD-MCNC: 15.9 G/DL (ref 13–17.7)
LDLC SERPL CALC-MCNC: 104 MG/DL (ref 0–100)
LDLC/HDLC SERPL: 2.6 {RATIO}
MCH RBC QN AUTO: 29.2 PG (ref 26.6–33)
MCHC RBC AUTO-ENTMCNC: 32.4 G/DL (ref 31.5–35.7)
MCV RBC AUTO: 90.1 FL (ref 79–97)
PLATELET # BLD AUTO: 218 10*3/MM3 (ref 140–450)
PMV BLD AUTO: 10.2 FL (ref 6–12)
POTASSIUM BLD-SCNC: 4.6 MMOL/L (ref 3.5–5.2)
PROT SERPL-MCNC: 7.9 G/DL (ref 6–8.5)
PSA SERPL-MCNC: 0.81 NG/ML (ref 0–4)
RBC # BLD AUTO: 5.45 10*6/MM3 (ref 4.14–5.8)
SODIUM BLD-SCNC: 142 MMOL/L (ref 136–145)
T4 FREE SERPL-MCNC: 1.13 NG/DL (ref 0.93–1.7)
TRIGL SERPL-MCNC: 91 MG/DL (ref 0–150)
TSH SERPL DL<=0.05 MIU/L-ACNC: 1.79 UIU/ML (ref 0.27–4.2)
VLDLC SERPL-MCNC: 18.2 MG/DL (ref 5–40)
WBC NRBC COR # BLD: 5.6 10*3/MM3 (ref 3.4–10.8)

## 2020-09-20 ENCOUNTER — APPOINTMENT (OUTPATIENT)
Dept: CT IMAGING | Facility: HOSPITAL | Age: 45
End: 2020-09-20

## 2020-09-20 ENCOUNTER — HOSPITAL ENCOUNTER (EMERGENCY)
Facility: HOSPITAL | Age: 45
Discharge: HOME | End: 2020-09-20
Attending: EMERGENCY MEDICINE | Admitting: EMERGENCY MEDICINE

## 2020-09-20 VITALS
RESPIRATION RATE: 18 BRPM | BODY MASS INDEX: 35.21 KG/M2 | OXYGEN SATURATION: 99 % | WEIGHT: 260 LBS | HEIGHT: 72 IN | HEART RATE: 73 BPM | SYSTOLIC BLOOD PRESSURE: 148 MMHG | DIASTOLIC BLOOD PRESSURE: 102 MMHG | TEMPERATURE: 97.5 F

## 2020-09-20 DIAGNOSIS — Z04.1 ENCOUNTER FOR EXAMINATION FOLLOWING MOTOR VEHICLE COLLISION (MVC): Primary | ICD-10-CM

## 2020-09-20 DIAGNOSIS — M54.6 ACUTE THORACIC BACK PAIN, UNSPECIFIED BACK PAIN LATERALITY: ICD-10-CM

## 2020-09-20 DIAGNOSIS — M54.2 MUSCULOSKELETAL NECK PAIN: ICD-10-CM

## 2020-09-20 PROCEDURE — 72128 CT CHEST SPINE W/O DYE: CPT

## 2020-09-20 PROCEDURE — 99283 EMERGENCY DEPT VISIT LOW MDM: CPT

## 2020-09-20 PROCEDURE — 72125 CT NECK SPINE W/O DYE: CPT

## 2020-09-20 PROCEDURE — 70450 CT HEAD/BRAIN W/O DYE: CPT

## 2020-09-20 RX ORDER — CYCLOBENZAPRINE HCL 10 MG
10 TABLET ORAL 3 TIMES DAILY PRN
Qty: 15 TABLET | Refills: 0 | Status: SHIPPED | OUTPATIENT
Start: 2020-09-20 | End: 2020-09-25

## 2020-09-20 RX ORDER — NAPROXEN 500 MG/1
500 TABLET ORAL 2 TIMES DAILY PRN
Qty: 20 TABLET | Refills: 0 | Status: SHIPPED | OUTPATIENT
Start: 2020-09-20 | End: 2020-09-30
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
## 2020-09-21 ENCOUNTER — TELEPHONE (OUTPATIENT)
Dept: INTERNAL MEDICINE | Facility: CLINIC | Age: 45
End: 2020-09-21
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
## 2020-09-24 ENCOUNTER — HOSPITAL ENCOUNTER (OUTPATIENT)
Dept: GENERAL RADIOLOGY | Facility: HOSPITAL | Age: 45
Discharge: HOME | End: 2020-09-24
Admitting: INTERNAL MEDICINE

## 2020-09-24 ENCOUNTER — OFFICE VISIT (OUTPATIENT)
Dept: INTERNAL MEDICINE | Facility: CLINIC | Age: 45
End: 2020-09-24

## 2020-09-24 VITALS
TEMPERATURE: 97.8 F | RESPIRATION RATE: 20 BRPM | DIASTOLIC BLOOD PRESSURE: 90 MMHG | OXYGEN SATURATION: 97 % | WEIGHT: 285 LBS | BODY MASS INDEX: 38.65 KG/M2 | HEART RATE: 88 BPM | SYSTOLIC BLOOD PRESSURE: 150 MMHG

## 2020-09-24 DIAGNOSIS — M54.2 NECK PAIN: ICD-10-CM

## 2020-09-24 DIAGNOSIS — R35.0 URINARY FREQUENCY: ICD-10-CM

## 2020-09-24 DIAGNOSIS — S06.0X0A CONCUSSION WITHOUT LOSS OF CONSCIOUSNESS, INITIAL ENCOUNTER: ICD-10-CM

## 2020-09-24 DIAGNOSIS — M54.2 NECK PAIN: Primary | ICD-10-CM

## 2020-09-24 DIAGNOSIS — M62.838 MUSCLE SPASM OF SHOULDER REGION: ICD-10-CM

## 2020-09-24 LAB
BILIRUB BLD-MCNC: NEGATIVE MG/DL
CLARITY, POC: CLEAR
COLOR UR: YELLOW
EXPIRATION DATE: NORMAL
GLUCOSE UR STRIP-MCNC: NEGATIVE MG/DL
KETONES UR QL: NEGATIVE
LEUKOCYTE EST, POC: NEGATIVE
Lab: NORMAL
NITRITE UR-MCNC: NEGATIVE MG/ML
PH UR: 8 [PH] (ref 5–8)
PROT UR STRIP-MCNC: NEGATIVE MG/DL
RBC # UR STRIP: NEGATIVE /UL
SP GR UR: 1.01 (ref 1–1.03)
UROBILINOGEN UR QL: NORMAL

## 2020-09-24 PROCEDURE — 81003 URINALYSIS AUTO W/O SCOPE: CPT | Performed by: INTERNAL MEDICINE

## 2020-09-24 PROCEDURE — 72040 X-RAY EXAM NECK SPINE 2-3 VW: CPT

## 2020-09-24 PROCEDURE — 99214 OFFICE O/P EST MOD 30 MIN: CPT | Performed by: INTERNAL MEDICINE
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
## 2020-10-06 ENCOUNTER — APPOINTMENT (OUTPATIENT)
Dept: CT IMAGING | Facility: HOSPITAL | Age: 45
End: 2020-10-06

## 2020-10-06 ENCOUNTER — HOSPITAL ENCOUNTER (EMERGENCY)
Facility: HOSPITAL | Age: 45
Discharge: HOME | End: 2020-10-06
Attending: EMERGENCY MEDICINE | Admitting: EMERGENCY MEDICINE

## 2020-10-06 VITALS
SYSTOLIC BLOOD PRESSURE: 164 MMHG | HEIGHT: 72 IN | HEART RATE: 61 BPM | DIASTOLIC BLOOD PRESSURE: 98 MMHG | WEIGHT: 260 LBS | OXYGEN SATURATION: 98 % | TEMPERATURE: 97.7 F | BODY MASS INDEX: 35.21 KG/M2 | RESPIRATION RATE: 16 BRPM

## 2020-10-06 DIAGNOSIS — S16.1XXA ACUTE CERVICAL MYOFASCIAL STRAIN, INITIAL ENCOUNTER: ICD-10-CM

## 2020-10-06 DIAGNOSIS — R03.0 ELEVATED BLOOD PRESSURE READING: ICD-10-CM
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
## 2020-10-06 DIAGNOSIS — R51.9 NONINTRACTABLE HEADACHE, UNSPECIFIED CHRONICITY PATTERN, UNSPECIFIED HEADACHE TYPE: Primary | ICD-10-CM

## 2020-10-06 LAB
ANION GAP SERPL CALCULATED.3IONS-SCNC: 11 MMOL/L (ref 5–15)
BASOPHILS # BLD AUTO: 0.04 10*3/MM3 (ref 0–0.2)
BASOPHILS NFR BLD AUTO: 0.7 % (ref 0–1.5)
BUN SERPL-MCNC: 13 MG/DL (ref 6–20)
BUN/CREAT SERPL: 11.6 (ref 7–25)
CALCIUM SPEC-SCNC: 9.2 MG/DL (ref 8.6–10.5)
CHLORIDE SERPL-SCNC: 100 MMOL/L (ref 98–107)
CO2 SERPL-SCNC: 29 MMOL/L (ref 22–29)
CREAT SERPL-MCNC: 1.12 MG/DL (ref 0.76–1.27)
DEPRECATED RDW RBC AUTO: 43.3 FL (ref 37–54)
EOSINOPHIL # BLD AUTO: 0.12 10*3/MM3 (ref 0–0.4)
EOSINOPHIL NFR BLD AUTO: 2.1 % (ref 0.3–6.2)
ERYTHROCYTE [DISTWIDTH] IN BLOOD BY AUTOMATED COUNT: 12.7 % (ref 12.3–15.4)
GFR SERPL CREATININE-BSD FRML MDRD: 85 ML/MIN/1.73
GLUCOSE SERPL-MCNC: 89 MG/DL (ref 65–99)
HBA1C MFR BLD: 6.1 % (ref 4.8–5.6)
HCT VFR BLD AUTO: 48.8 % (ref 37.5–51)
HGB BLD-MCNC: 15.8 G/DL (ref 13–17.7)
IMM GRANULOCYTES # BLD AUTO: 0.02 10*3/MM3 (ref 0–0.05)
IMM GRANULOCYTES NFR BLD AUTO: 0.4 % (ref 0–0.5)
LYMPHOCYTES # BLD AUTO: 2.49 10*3/MM3 (ref 0.7–3.1)
LYMPHOCYTES NFR BLD AUTO: 43.7 % (ref 19.6–45.3)
MCH RBC QN AUTO: 30 PG (ref 26.6–33)
MCHC RBC AUTO-ENTMCNC: 32.4 G/DL (ref 31.5–35.7)
MCV RBC AUTO: 92.6 FL (ref 79–97)
MONOCYTES # BLD AUTO: 0.54 10*3/MM3 (ref 0.1–0.9)
MONOCYTES NFR BLD AUTO: 9.5 % (ref 5–12)
NEUTROPHILS NFR BLD AUTO: 2.49 10*3/MM3 (ref 1.7–7)
NEUTROPHILS NFR BLD AUTO: 43.6 % (ref 42.7–76)
NRBC BLD AUTO-RTO: 0 /100 WBC (ref 0–0.2)
PLATELET # BLD AUTO: 214 10*3/MM3 (ref 140–450)
PMV BLD AUTO: 9.2 FL (ref 6–12)
POTASSIUM SERPL-SCNC: 4.1 MMOL/L (ref 3.5–5.2)
RBC # BLD AUTO: 5.27 10*6/MM3 (ref 4.14–5.8)
SODIUM SERPL-SCNC: 140 MMOL/L (ref 136–145)
WBC # BLD AUTO: 5.7 10*3/MM3 (ref 3.4–10.8)

## 2020-10-06 PROCEDURE — 25010000002 KETOROLAC TROMETHAMINE PER 15 MG: Performed by: NURSE PRACTITIONER

## 2020-10-06 PROCEDURE — 99284 EMERGENCY DEPT VISIT MOD MDM: CPT

## 2020-10-06 PROCEDURE — 96375 TX/PRO/DX INJ NEW DRUG ADDON: CPT

## 2020-10-06 PROCEDURE — 85025 COMPLETE CBC W/AUTO DIFF WBC: CPT | Performed by: PHYSICIAN ASSISTANT

## 2020-10-06 PROCEDURE — 96374 THER/PROPH/DIAG INJ IV PUSH: CPT

## 2020-10-06 PROCEDURE — 83036 HEMOGLOBIN GLYCOSYLATED A1C: CPT | Performed by: NURSE PRACTITIONER

## 2020-10-06 PROCEDURE — 70496 CT ANGIOGRAPHY HEAD: CPT

## 2020-10-06 PROCEDURE — 70498 CT ANGIOGRAPHY NECK: CPT

## 2020-10-06 PROCEDURE — 70450 CT HEAD/BRAIN W/O DYE: CPT

## 2020-10-06 PROCEDURE — 0 IOPAMIDOL PER 1 ML: Performed by: EMERGENCY MEDICINE

## 2020-10-06 PROCEDURE — 80048 BASIC METABOLIC PNL TOTAL CA: CPT | Performed by: PHYSICIAN ASSISTANT

## 2020-10-06 PROCEDURE — 25010000002 METOCLOPRAMIDE PER 10 MG: Performed by: NURSE PRACTITIONER

## 2020-10-06 PROCEDURE — 25010000002 DIPHENHYDRAMINE PER 50 MG: Performed by: NURSE PRACTITIONER

## 2020-10-06 RX ORDER — METHOCARBAMOL 750 MG/1
750 TABLET, FILM COATED ORAL 3 TIMES DAILY PRN
Qty: 20 TABLET | Refills: 0 | Status: SHIPPED | OUTPATIENT
Start: 2020-10-06 | End: 2020-10-14

## 2020-10-06 RX ORDER — METOCLOPRAMIDE HYDROCHLORIDE 5 MG/ML
10 INJECTION INTRAMUSCULAR; INTRAVENOUS ONCE
Status: COMPLETED | OUTPATIENT
Start: 2020-10-06 | End: 2020-10-06

## 2020-10-06 RX ORDER — KETOROLAC TROMETHAMINE 15 MG/ML
15 INJECTION, SOLUTION INTRAMUSCULAR; INTRAVENOUS ONCE
Status: COMPLETED | OUTPATIENT
Start: 2020-10-06 | End: 2020-10-06

## 2020-10-06 RX ORDER — DIPHENHYDRAMINE HYDROCHLORIDE 50 MG/ML
25 INJECTION INTRAMUSCULAR; INTRAVENOUS ONCE
Status: COMPLETED | OUTPATIENT
Start: 2020-10-06 | End: 2020-10-06

## 2020-10-06 RX ADMIN — DIPHENHYDRAMINE HYDROCHLORIDE 25 MG: 50 INJECTION INTRAMUSCULAR; INTRAVENOUS at 20:21

## 2020-10-06 RX ADMIN — METOCLOPRAMIDE 10 MG: 5 INJECTION, SOLUTION INTRAMUSCULAR; INTRAVENOUS at 20:22

## 2020-10-06 RX ADMIN — SODIUM CHLORIDE 1000 ML: 9 INJECTION, SOLUTION INTRAVENOUS at 20:21

## 2020-10-06 RX ADMIN — IOPAMIDOL 75 ML: 755 INJECTION, SOLUTION INTRAVENOUS at 19:10

## 2020-10-06 RX ADMIN — KETOROLAC TROMETHAMINE 15 MG: 15 INJECTION, SOLUTION INTRAMUSCULAR; INTRAVENOUS at 20:21
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
## 2020-10-07 NOTE — DISCHARGE INSTRUCTIONS
Keep a written log of your blood pressures and review this with Dr. Chiang.  Anti-inflammatories and muscle relaxers as directed.  Warm compresses can make helpful.  Thank you

## 2020-10-14 ENCOUNTER — OFFICE VISIT (OUTPATIENT)
Dept: INTERNAL MEDICINE | Facility: CLINIC | Age: 45
End: 2020-10-14

## 2020-10-14 VITALS
TEMPERATURE: 98.7 F | OXYGEN SATURATION: 98 % | WEIGHT: 287.5 LBS | DIASTOLIC BLOOD PRESSURE: 90 MMHG | HEART RATE: 89 BPM | SYSTOLIC BLOOD PRESSURE: 142 MMHG | BODY MASS INDEX: 38.99 KG/M2 | RESPIRATION RATE: 20 BRPM

## 2020-10-14 DIAGNOSIS — M25.511 ACUTE PAIN OF BOTH SHOULDERS: ICD-10-CM

## 2020-10-14 DIAGNOSIS — M62.830 BACK MUSCLE SPASM: ICD-10-CM

## 2020-10-14 DIAGNOSIS — Z23 NEED FOR VACCINATION: Primary | ICD-10-CM

## 2020-10-14 DIAGNOSIS — M54.2 NECK PAIN: ICD-10-CM

## 2020-10-14 DIAGNOSIS — M25.512 ACUTE PAIN OF BOTH SHOULDERS: ICD-10-CM

## 2020-10-14 PROCEDURE — 99213 OFFICE O/P EST LOW 20 MIN: CPT | Performed by: INTERNAL MEDICINE

## 2020-10-14 PROCEDURE — 90686 IIV4 VACC NO PRSV 0.5 ML IM: CPT | Performed by: INTERNAL MEDICINE

## 2020-10-14 PROCEDURE — 90471 IMMUNIZATION ADMIN: CPT | Performed by: INTERNAL MEDICINE

## 2020-10-14 RX ORDER — CYCLOBENZAPRINE HCL 10 MG
10 TABLET ORAL 3 TIMES DAILY PRN
Qty: 45 TABLET | Refills: 3 | Status: SHIPPED | OUTPATIENT
Start: 2020-10-14 | End: 2021-09-01

## 2020-10-14 RX ORDER — IBUPROFEN 800 MG/1
800 TABLET ORAL EVERY 6 HOURS PRN
Qty: 50 TABLET | Refills: 2 | Status: SHIPPED | OUTPATIENT
Start: 2020-10-14 | End: 2022-07-30
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
## 2020-11-04 ENCOUNTER — TREATMENT (OUTPATIENT)
Dept: PHYSICAL THERAPY | Facility: CLINIC | Age: 45
End: 2020-11-04

## 2020-11-04 DIAGNOSIS — G89.29 CHRONIC BILATERAL LOW BACK PAIN WITHOUT SCIATICA: ICD-10-CM

## 2020-11-04 DIAGNOSIS — M54.50 CHRONIC BILATERAL LOW BACK PAIN WITHOUT SCIATICA: ICD-10-CM

## 2020-11-04 DIAGNOSIS — G44.86 CERVICOGENIC HEADACHE: Primary | ICD-10-CM

## 2020-11-04 PROCEDURE — 97110 THERAPEUTIC EXERCISES: CPT | Performed by: PHYSICAL THERAPIST

## 2020-11-04 PROCEDURE — 97162 PT EVAL MOD COMPLEX 30 MIN: CPT | Performed by: PHYSICAL THERAPIST

## 2020-11-04 NOTE — PATIENT INSTRUCTIONS
Issued initial HEP including supine chin tucks, supine cervical rotation AROM (pn free range), upper cervical rotation ROM, LTR, and DKTC stretch. Discussed use of heat prior to HEP To facilitate muscle relaxation for more comfortable stretching, avoidance of exacerbating activities.
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
## 2020-11-06 ENCOUNTER — TREATMENT (OUTPATIENT)
Dept: PHYSICAL THERAPY | Facility: CLINIC | Age: 45
End: 2020-11-06

## 2020-11-06 DIAGNOSIS — M54.50 CHRONIC BILATERAL LOW BACK PAIN WITHOUT SCIATICA: ICD-10-CM

## 2020-11-06 DIAGNOSIS — G44.86 CERVICOGENIC HEADACHE: Primary | ICD-10-CM

## 2020-11-06 DIAGNOSIS — G89.29 CHRONIC BILATERAL LOW BACK PAIN WITHOUT SCIATICA: ICD-10-CM

## 2020-11-06 PROCEDURE — 97110 THERAPEUTIC EXERCISES: CPT | Performed by: PHYSICAL THERAPIST

## 2020-11-06 PROCEDURE — 97014 ELECTRIC STIMULATION THERAPY: CPT | Performed by: PHYSICAL THERAPIST

## 2020-11-06 PROCEDURE — 97140 MANUAL THERAPY 1/> REGIONS: CPT | Performed by: PHYSICAL THERAPIST

## 2020-11-06 NOTE — PROGRESS NOTES
Physical Therapy Daily Progress Note  Visit: 2      Patient: Pj Zuleta   : 1975  Referring practitioner: Carl Chiang MD  Visit Diagnosis:     ICD-10-CM ICD-9-CM   1. Cervicogenic headache  R51.9 784.0   2. Chronic bilateral low back pain without sciatica  M54.5 724.2    G89.29 338.29     Date of Initial Visit: Type: THERAPY  Noted: 2020  Today's Date: 2020        Subjective     Pj Zuleta reports: Working on his HEP, 1x/day. Has been in constant pn, worsened w/ moving his head and neck, especially w/ looking side to side. His entire neck feels sore but his worst pn is along the top of both shoulders (upper trap B).     Treatment  Pre Treatment Pn Score: 7  Post Treatment Pn Score:  5    Exercise 1  Exercise Name 1: supine chin tuck  Sets/Reps 1: 10  Exercise 2  Exercise Name 2: supine chin tuck w/ retraction  Sets/Reps 2: 10  Time 2: 3 sec  Exercise 3  Exercise Name 3: supine cervical rotation  Sets/Reps 3: 10 ea    Manual Rx 1  Manual Rx 1 Location: cervical  Manual Rx 1 Type: supine intermittent manual cervical traction  Manual Rx 2  Manual Rx 2 Location: cervical  Manual Rx 2 Type: supine PA glides  Manual Rx 2 Grade: Gr I-II  Manual Rx 3  Manual Rx 3 Location: lower cervical  Manual Rx 3 Type: C5-7 SNAG w/ cervical rotation  Manual Rx 3 Duration: 5 reps ea side    Electrical Stimulation  Stimulation Type: Pre-Mod  Max mAmp: 12  Location/Electrode Placement/Other: B UT  Rx Minutes: 10 mins  Other Treatment Provided  MH Applied: Yes  Rx Minutes: 10 mins(w/ Estim)  MH S/P Rx: Yes  Stimulation Type: Pre-Mod  Rx Minutes: 10 mins  Location/Electrode Placement/Other: B UT       Objective      Tenderness, muscle spasm B UT     Assessment & Plan     Assessment  Assessment details: Pt compliant w/ initial HEP. Denies any changes in his symptoms. His symptoms continue to be very highly irritable. He has minimal tolerance to low level cervical mobility exercises and manual therapy  techniques. Focused today on supine ROM, DNF activation exercises so that head/neck was supported. Incorporated MT techniques aimed at improving segmental mobility and promoting mm relaxation. Pt did report improved rotation ROM and decreased pn w/ movement during Cervical SNAGs. Instructed in self cervical rotation SNAG w/ towel and encouraged him to incorporate this into his HEP.    Plan  Plan details: Assess response to addition of self SNAGs and proceed as indicated.               Timed:  Manual Therapy:    15     mins  09069;  Therapeutic Exercise:    18     mins  14801;     Neuromuscular Preston:    0    mins  69613;    Therapeutic Activity:     0     mins  48702;     Gait Trainin     mins  62449;     Ultrasound:     0     mins  59688;    Electrical Stimulation:    10     mins  06441 ( );    Untimed:  Electrical Stimulation:    0     mins  86232 ( );  Mechanical Traction:    0     mins  39612;     Timed Treatment:   43   mins   Total Treatment:     46   mins      Annette Su, PT  Physical Therapist

## 2020-11-10 ENCOUNTER — TREATMENT (OUTPATIENT)
Dept: PHYSICAL THERAPY | Facility: CLINIC | Age: 45
End: 2020-11-10

## 2020-11-10 DIAGNOSIS — G44.86 CERVICOGENIC HEADACHE: Primary | ICD-10-CM

## 2020-11-10 DIAGNOSIS — G89.29 CHRONIC BILATERAL LOW BACK PAIN WITHOUT SCIATICA: ICD-10-CM

## 2020-11-10 DIAGNOSIS — M54.50 CHRONIC BILATERAL LOW BACK PAIN WITHOUT SCIATICA: ICD-10-CM

## 2020-11-10 PROCEDURE — 97140 MANUAL THERAPY 1/> REGIONS: CPT | Performed by: PHYSICAL THERAPIST

## 2020-11-10 NOTE — PROGRESS NOTES
Physical Therapy Daily Progress Note  Visit: 3      Patient: Pj Zuleta   : 1975  Referring practitioner: Carl Chiang MD  Visit Diagnosis:     ICD-10-CM ICD-9-CM   1. Cervicogenic headache  R51.9 784.0   2. Chronic bilateral low back pain without sciatica  M54.5 724.2    G89.29 338.29     Date of Initial Visit: Type: THERAPY  Noted: 2020  Today's Date: 11/10/2020        Subjective     Pj Zuleta reports: Pt states that he has been trying to push his stretches and exercises and is sore and stiff. Pn feels about the same. Tried riding bikes w/ his son and ended up having to walk his bike back home. 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 Put a heating pad on and worked on stretches but headache started. Today feels pn/tightness/soreness in B UT mm, as well as head pn B temporal/parietal/occipital region as. Also notes pressure behind his eyes.    Treatment  Pre Treatment Pn Score: 5-6  Post Treatment Pn Score:  3         Manual Rx 1  Manual Rx 1 Location: suboccipital release  Manual Rx 2  Manual Rx 2 Location: cervical  Manual Rx 2 Type: prone PA glides  Manual Rx 2 Grade: Gr I-II  Manual Rx 3  Manual Rx 3 Location: lower cervical  Manual Rx 3 Type: C5-7 SNAG w/ cervical rotation +OP  Manual Rx 3 Duration: 5 reps ea side    Dry Needling 1  Region/Structure 1: B UT  Technique 1: in/out  Needle Size 1: 1 in.  Quantity of Needles 1: 2 ea  Dry Needling 2  Region/Structure 2: suboccipital  Technique 2: in/out  Needle Size 2: .5 in  Quantity of Needles 2: 2       Objective      Cervical ROM WFL, but SB/rotation appear labored, tremulous and reproduce B UT pn/soreness     Assessment & Plan     Assessment  Assessment details: Pt denies any significant change in B neck tension/soreness and HA so far w/ HEP and MT techniques, though did have some relief w/ Estim and MHP performed last visit. Pt reported slight reduction in HA w/ suboccipital  release, demonstrated some immediate improvement in cervical mobility after MT techniques. However he appeared to get the most relief w/ introduction of TPDN of B UT and suboccipital mm. Afterward pt reported near full relief of R UT/cervical pn and significant reduction in HA intensity. He reported no change in L UT/cervical discomfort. Discussed application of heat a few times today to augment effects of TPDN.    Plan  Plan details: Assess response to TPDN and proceed as indicated.               Timed:  Manual Therapy:    24     mins  91716;  Therapeutic Exercise:    0     mins  81397;     Neuromuscular Preston:    0    mins  82322;    Therapeutic Activity:     0     mins  11296;     Gait Trainin     mins  61708;     Ultrasound:     0     mins  72144;    Electrical Stimulation:    0     mins  07109 ( );    Untimed:  Electrical Stimulation:    0     mins  15340 ( );  Mechanical Traction:    0     mins  88358;     Timed Treatment:   24   mins   Total Treatment:     30   mins      Annette Su, PT  Physical Therapist

## 2020-11-12 ENCOUNTER — TREATMENT (OUTPATIENT)
Dept: PHYSICAL THERAPY | Facility: CLINIC | Age: 45
End: 2020-11-12

## 2020-11-12 DIAGNOSIS — G89.29 CHRONIC BILATERAL LOW BACK PAIN WITHOUT SCIATICA: ICD-10-CM

## 2020-11-12 DIAGNOSIS — M54.50 CHRONIC BILATERAL LOW BACK PAIN WITHOUT SCIATICA: ICD-10-CM

## 2020-11-12 DIAGNOSIS — G44.86 CERVICOGENIC HEADACHE: Primary | ICD-10-CM

## 2020-11-12 PROCEDURE — 97110 THERAPEUTIC EXERCISES: CPT | Performed by: PHYSICAL THERAPIST

## 2020-11-12 NOTE — PROGRESS NOTES
Physical Therapy Daily Progress Note  Visit: 4      Patient: Pj Zuleta   : 1975  Referring practitioner: Carl Chiang MD  Visit Diagnosis:     ICD-10-CM ICD-9-CM   1. Cervicogenic headache  R51.9 784.0   2. 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 While he feels like he is making slight progress, he continues to feel frustrated that his HA and B neck/shoulder soreness are not fully resolved.    Treatment  Pre Treatment Pn Score: 5  Post Treatment Pn Score:  5    Exercise 1  Exercise Name 1: scap slides at wall  Sets/Reps 1: 20  Exercise 2  Exercise Name 2: rows  Equipment/Resistance 2: GTB  Sets/Reps 2: 30  Exercise 3  Exercise Name 3: chin tucks at wall  Sets/Reps 3: 20  Exercise 4  Exercise Name 4: instruction in self suboccipital release w/ tennis balls/sock  Exercise 5  Exercise Name 5: review HEP         Dry Needling 1  Region/Structure 1: B UT  Technique 1: pistoning  Needle Size 1: 1 in.  Quantity of Needles 1: 3 ea  Response 1: twitch  Dry Needling 2  Region/Structure 2: suboccipital  Technique 2: in/out  Needle Size 2: .5 in  Quantity of Needles 2: 6  Objective       Assessment & Plan     Assessment  Assessment details: Pt reports slight improvement in B shoulder/neck soreness but does not note any significant change in HA. HA most frequently onset as the day progresses, especially in the evening hours, are located bilateral temporal/frontal regions, and often associated w/ blurred vision and nausea. He reports favorable, though temporary, response to TPDN introduced last visit. Continued w/ DN today, during which pt experienced twitch responses B UT. Progressed postural strengthening per chart and  discussed use of tennis balls for self suboccipital release at home.    Plan  Plan details: Assess response to self sub occipital release, continue w/ TPDN and postural strengthening               Timed:  Manual Therapy:    0     mins  83961;  Therapeutic Exercise:    24     mins  29417;     Neuromuscular Preston:    0    mins  52431;    Therapeutic Activity:     0     mins  12279;     Gait Trainin     mins  72147;     Ultrasound:     0     mins  99343;    Electrical Stimulation:    0     mins  15812 ( );    Untimed:  Electrical Stimulation:    0     mins  57311 ( );  Mechanical Traction:    0     mins  55694;     Timed Treatment:   24   mins   Total Treatment:     30   mins      Annette Su, PT  Physical Therapist

## 2020-11-18 ENCOUNTER — TREATMENT (OUTPATIENT)
Dept: PHYSICAL THERAPY | Facility: CLINIC | Age: 45
End: 2020-11-18

## 2020-11-18 DIAGNOSIS — M54.50 CHRONIC BILATERAL LOW BACK PAIN WITHOUT SCIATICA: ICD-10-CM

## 2020-11-18 DIAGNOSIS — G44.86 CERVICOGENIC HEADACHE: Primary | ICD-10-CM

## 2020-11-18 DIAGNOSIS — G89.29 CHRONIC BILATERAL LOW BACK PAIN WITHOUT SCIATICA: ICD-10-CM

## 2020-11-18 PROCEDURE — 97140 MANUAL THERAPY 1/> REGIONS: CPT | Performed by: PHYSICAL THERAPIST

## 2020-11-18 NOTE — PROGRESS NOTES
Physical Therapy Daily Progress Note  Visit: 5      Patient: Pj Zuleta   : 1975  Referring practitioner: Carl Chiang MD  Visit Diagnosis:     ICD-10-CM ICD-9-CM   1. Cervicogenic headache  R51.9 784.0   2. Chronic bilateral low back pain without sciatica  M54.5 724.2    G89.29 338.29     Date of Initial Visit: Type: THERAPY  Noted: 2020  Today's Date: 2020        Subjective     Pj Zuleta reports: Pt reports that he had about two days of relief after his last PT visit. He was feeling better up until yesterday when trying to move a refrigerator, experienced severe pn in the mid back up to his neck. He continues to have intermittent headaches, and he continues to have significant soreness B UT w/ sidebending, rotation of his head.    Treatment  Pre Treatment Pn Score: 5  Post Treatment Pn Score:  5    Exercise 1  Exercise Name 1: quadruped thoracic rotation stretch  Sets/Reps 1: 4 ea    Manual Rx 1  Manual Rx 1 Location: suboccipital release  Manual Rx 2  Manual Rx 2 Location: cervical  Manual Rx 2 Type: prone PA glides  Manual Rx 2 Grade: Gr I-II  Manual Rx 3  Manual Rx 3 Location: thoracic  Manual Rx 3 Type: prone PA glides-mid/upper segments  Manual Rx 3 Grade: Gr I-II-II       Objective       Assessment & Plan     Assessment  Assessment details: Tx time significantly limited due to 30 min late arrival. Pt reports having had 2 days of significant pn relief after his last PT visit, but symptoms re-exacerbated after attempting to move a refrigerator. Due to limited time today focused mainly on MT techniques, including suboccipital release and mobilization of the thoracic and cervical spine. Pt remains highly TTP throughout the neck and upper back, limiting his tolerance to mobilization. Recommended continuing suboccipital release to manage headaches, as well as adding quadruped thoracic rotation stretch to improve mobility.    Plan  Plan details: Continue w/ current  POC               Timed:  Manual Therapy:    10     mins  78899;  Therapeutic Exercise:    2    mins  21545;     Neuromuscular Preston:    0    mins  41201;    Therapeutic Activity:     0     mins  29903;     Gait Trainin     mins  95657;     Ultrasound:     0     mins  01837;    Electrical Stimulation:    0     mins  51437 ( );    Untimed:  Electrical Stimulation:    0     mins  88812 ( );  Mechanical Traction:    0     mins  07711;     Timed Treatment:   12   mins   Total Treatment:     12   mins      Annette Su, PT  Physical Therapist

## 2020-11-20 ENCOUNTER — TREATMENT (OUTPATIENT)
Dept: PHYSICAL THERAPY | Facility: CLINIC | Age: 45
End: 2020-11-20

## 2020-11-20 DIAGNOSIS — G89.29 CHRONIC BILATERAL LOW BACK PAIN WITHOUT SCIATICA: ICD-10-CM

## 2020-11-20 DIAGNOSIS — M54.50 CHRONIC BILATERAL LOW BACK PAIN WITHOUT SCIATICA: ICD-10-CM

## 2020-11-20 DIAGNOSIS — G44.86 CERVICOGENIC HEADACHE: Primary | ICD-10-CM

## 2020-11-20 PROCEDURE — 97110 THERAPEUTIC EXERCISES: CPT | Performed by: PHYSICAL THERAPIST

## 2020-11-20 NOTE — PROGRESS NOTES
"   Physical Therapy Daily Progress Note  Visit: 6      Patient: Pj Zuleta   : 1975  Referring practitioner: Carl Chiang MD  Visit Diagnosis:     ICD-10-CM ICD-9-CM   1. Cervicogenic headache  R51.9 784.0   2. Chronic bilateral low back pain without sciatica  M54.5 724.2    G89.29 338.29     Date of Initial Visit: Type: THERAPY  Noted: 2020  Today's Date: 2020        Subjective     Pj Zuleta reports: Pt reports that he is feeling \"okay\" today. He notes improvement in pn overall since his previous visit, and believes over time his exercises are going to be helpful. He continues to have intermittent headache associated w/ blurred vision.    Treatment  Pre Treatment Pn Score: 4  Post Treatment Pn Score:  4    Exercise 1  Exercise Name 1: 3 wall ball rollout low back stretch  Sets/Reps 1: 3 ea  Time: 5 sec  Exercise 2  Exercise Name 2: rows  Equipment/Resistance 2: GTB  Sets/Reps 2: 25  Exercise 3  Exercise Name 3: fencing  Equipment/Resistance 3: GTB  Sets/Reps 3: 12 ea  Exercise 4  Exercise Name 4: lat pulls  Equipment/Resistance 4: GTB  Sets/Reps 4: 20  Exercise 5  Exercise Name 5: lat stretch at wall  Sets/Reps 5: 3  Time 5: 15 sec ea  Exercise 6  Exercise Name 6: LTR w/ ball  Sets/Reps 6: 3 ea  Exercise 7  Exercise Name 7: DKTC stretch  Sets/Reps 7: 3  Time 7: 15 sec  Exercise 8  Exercise Name 8: prayer stretch-midline  Sets/Reps 8: 3  Time 8: 15 sec         Dry Needling 1  Region/Structure 1: suboccipital  Technique 1: winding  Needle Size 1: .5  Quantity of Needles 1: 8       Objective      Increased tone noted L suboccipital mm during TPDN     Assessment & Plan     Assessment  Assessment details: Pt reported significant reduction in headache, periocularrbital pressure after TPDN of the suboccipitals. Continued w/ trunk stretching and incorporated scapular strengthening, all of which pt tolerates fairly well, reporting primarily soreness along the mid back paravertebrals. " Issued GTB and discussed adding TB rows and fencing for strengthening.               Timed:  Manual Therapy:    0     mins  55651;  Therapeutic Exercise:    38     mins  43419;     Neuromuscular Preston:    0    mins  28756;    Therapeutic Activity:     0     mins  93891;     Gait Trainin     mins  58740;     Ultrasound:     0     mins  47108;    Electrical Stimulation:    0     mins  24986 ( );    Untimed:  Electrical Stimulation:    0     mins  97671 ( );  Mechanical Traction:    0     mins  28654;     Timed Treatment:   38   mins   Total Treatment:     45   mins      Annette Su, PT  Physical Therapist

## 2020-11-23 ENCOUNTER — TREATMENT (OUTPATIENT)
Dept: PHYSICAL THERAPY | Facility: CLINIC | Age: 45
End: 2020-11-23

## 2020-11-23 DIAGNOSIS — J45.20 MILD INTERMITTENT ASTHMA WITHOUT COMPLICATION: ICD-10-CM

## 2020-11-23 DIAGNOSIS — M54.50 CHRONIC BILATERAL LOW BACK PAIN WITHOUT SCIATICA: ICD-10-CM

## 2020-11-23 DIAGNOSIS — J30.1 SEASONAL ALLERGIC RHINITIS DUE TO POLLEN: ICD-10-CM

## 2020-11-23 DIAGNOSIS — G44.86 CERVICOGENIC HEADACHE: Primary | ICD-10-CM

## 2020-11-23 DIAGNOSIS — G89.29 CHRONIC BILATERAL LOW BACK PAIN WITHOUT SCIATICA: ICD-10-CM

## 2020-11-23 PROCEDURE — 97110 THERAPEUTIC EXERCISES: CPT | Performed by: PHYSICAL THERAPIST

## 2020-11-23 RX ORDER — FLUTICASONE PROPIONATE 50 MCG
2 SPRAY, SUSPENSION (ML) NASAL DAILY
Qty: 1 BOTTLE | Refills: 3 | Status: SHIPPED | OUTPATIENT
Start: 2020-11-23 | End: 2021-09-01 | Stop reason: SDUPTHER

## 2020-11-23 RX ORDER — ALBUTEROL SULFATE 1.25 MG/3ML
1 SOLUTION RESPIRATORY (INHALATION) EVERY 6 HOURS PRN
Qty: 30 VIAL | Refills: 3 | Status: SHIPPED | OUTPATIENT
Start: 2020-11-23 | End: 2022-03-01 | Stop reason: SDUPTHER

## 2020-11-23 RX ORDER — ALBUTEROL SULFATE 90 UG/1
2 AEROSOL, METERED RESPIRATORY (INHALATION) EVERY 4 HOURS PRN
Qty: 18 G | Refills: 4 | Status: SHIPPED | OUTPATIENT
Start: 2020-11-23 | End: 2021-09-01 | Stop reason: SDUPTHER

## 2020-11-23 RX ORDER — BUDESONIDE AND FORMOTEROL FUMARATE DIHYDRATE 160; 4.5 UG/1; UG/1
2 AEROSOL RESPIRATORY (INHALATION)
Qty: 1 INHALER | Refills: 6 | Status: SHIPPED | OUTPATIENT
Start: 2020-11-23 | End: 2021-09-01 | Stop reason: SDUPTHER

## 2020-11-23 RX ORDER — MONTELUKAST SODIUM 10 MG/1
10 TABLET ORAL DAILY
Qty: 30 TABLET | Refills: 6 | Status: SHIPPED | OUTPATIENT
Start: 2020-11-23

## 2020-11-23 NOTE — TELEPHONE ENCOUNTER
Last office visit 10/9/2019    12/3/2020 next office visit   5/3/2018 albuterol  ACCUNEB  #30 refill 0  10/19/2020 Fluticasone  #1  Refill 3   10/9/2019 Albuterol Sulfate HFA  #1  Refill 3  10/9/2019 symbicort   #1  Refill 6   10/9/2019  Last Rx  # 30  Refill 6

## 2020-11-23 NOTE — PROGRESS NOTES
Physical Therapy Daily Progress Note  Visit: 7      Patient: Pj Zuleta   : 1975  Referring practitioner: Carl Chiang MD  Visit Diagnosis:     ICD-10-CM ICD-9-CM   1. Cervicogenic headache  R51.9 784.0   2. 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 Working on TB exercises. Believes that exercises are helping. 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 Planning to cut down a 15 ft tree, wants to see what he can tolerate.    Treatment  Pre Treatment Pn Score: 0  Post Treatment Pn Score:  0    Exercise 1  Exercise Name 1: 3 way ball rollout low back stretch  Sets/Reps 1: 5 ea  Exercise 2  Exercise Name 2: lat stretch at wall  Sets/Reps 2: 3  Time 2: 15 sec ea  Exercise 3  Exercise Name 3: cross body stretch  Sets/Reps 3: 3  Time 3: 15 sec ea  Exercise 4  Exercise Name 4: prayer stretch  Sets/Reps 4: 3  Time 4: 15 sec  Exercise 5  Exercise Name 5: rows  Equipment/Resistance 5: GTB  Sets/Reps 5: 20  Exercise 6  Exercise Name 6: fencing  Equipment/Resistance 6: GTB  Sets/Reps 6: 20  Exercise 7  Exercise Name 7: lat pulls  Equipment/Resistance 7: GTB  Sets/Reps 7: 20  Exercise 8  Exercise Name 8: suboccipital stretch-mid line, angled  Sets/Reps 8: 3 ea  Exercise 9  Exercise Name 9: ROM measurements         Dry Needling 1  Region/Structure 1: suboccipital  Technique 1: winding, gentle pistoning  Needle Size 1: .5  Quantity of Needles 1: 8  Objective          Active Range of Motion   Cervical/Thoracic Spine   Cervical    Flexion: 65 degrees   Extension: 58 degrees   Left lateral flexion: 45 degrees   Right  lateral flexion: 45 degrees   Left rotation: 65 degrees   Right rotation: 70 degrees     Additional Active Range of Motion Details  Reports tightness contralateral suboccipital/mastoid regions w/ sidebending  No pn w/ AROM          Assessment & Plan     Assessment  Assessment details: Pt seems to be more encouraged about his recovery. He appears to be getting some good relief of his headaches, back/shoulder, neck discomfort w/ his exercises and agrees that HA are less intense, N/T less frequent than before. 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 Today he demonstrates normal cervical mobility, improved from initial evaluation, and reports only tightness in the suboccipitals w/ B sidebending. He denied any pn w/ testing. He again reported reduction in suboccipital tension after TPDN. Encouraged him to continue w/ TB strengthening, self STM, and stretching program.    Plan  Plan details: Reassess, send MD progress report               Timed:  Manual Therapy:    0     mins  51477;  Therapeutic Exercise:    38     mins  72987;     Neuromuscular Preston:    0    mins  91139;    Therapeutic Activity:     0     mins  16003;     Gait Trainin     mins  00572;     Ultrasound:     0     mins  31754;    Electrical Stimulation:    0     mins  02433 ( );    Untimed:  Electrical Stimulation:    0     mins  15147 ( );  Mechanical Traction:    0     mins  77525;     Timed Treatment:   38   mins   Total Treatment:     45   mins      Annette Su, PT  Physical Therapist

## 2020-11-23 NOTE — TELEPHONE ENCOUNTER
Caller: Pj Zuleta    Relationship: Self    Best call back number: [phone]    Medication needed:   Requested Prescriptions     Pending Prescriptions Disp Refills   • montelukast (SINGULAIR) 10 MG tablet 30 tablet 6     Sig: Take 1 tablet by mouth Daily.   • budesonide-formoterol (Symbicort) 160-4.5 MCG/ACT inhaler 1 inhaler 6     Sig: Inhale 2 puffs 2 (Two) Times a Day.   • albuterol sulfate  (90 Base) MCG/ACT inhaler       Sig: Inhale 2 puffs Every 4 (Four) Hours As Needed for Wheezing.   • albuterol (ACCUNEB) 1.25 MG/3ML nebulizer solution 30 vial 0     Sig: Take 3 mL by nebulization Every 6 (Six) Hours As Needed for Wheezing.   • fluticasone (FLONASE) 50 MCG/ACT nasal spray 1 bottle 3     Si sprays into the nostril(s) as directed by provider Daily.       When do you need the refill by: 2020    What details did the patient provide when requesting the medication: Patient is completely out of his medication    Does the patient have less than a 3 day supply:  [x] Yes  [] No    What is the patient's preferred pharmacy: "[id number] [address] [place] [address] [address] AT [facility] [address] - [phone] [facility] [phone] FX

## 2020-12-02 ENCOUNTER — TREATMENT (OUTPATIENT)
Dept: PHYSICAL THERAPY | Facility: CLINIC | Age: 45
End: 2020-12-02

## 2020-12-02 DIAGNOSIS — G89.29 CHRONIC BILATERAL LOW BACK PAIN WITHOUT SCIATICA: ICD-10-CM

## 2020-12-02 DIAGNOSIS — G44.86 CERVICOGENIC HEADACHE: Primary | ICD-10-CM

## 2020-12-02 DIAGNOSIS — M54.50 CHRONIC BILATERAL LOW BACK PAIN WITHOUT SCIATICA: ICD-10-CM

## 2020-12-02 PROCEDURE — 97110 THERAPEUTIC EXERCISES: CPT | Performed by: PHYSICAL THERAPIST

## 2020-12-02 PROCEDURE — 97530 THERAPEUTIC ACTIVITIES: CPT | Performed by: PHYSICAL THERAPIST

## 2020-12-02 PROCEDURE — 97140 MANUAL THERAPY 1/> REGIONS: CPT | Performed by: PHYSICAL THERAPIST

## 2020-12-02 NOTE — PROGRESS NOTES
Re-Assessment / Re-Certification      Patient: Pj Zuleta   : 1975  Diagnosis/ICD-10 Code:  Cervicogenic headache [R51.9]  Referring practitioner: Carl Chiang MD  Date of Initial Visit: Type: THERAPY  Noted: 2020  Today's Date: 2020  Patient seen for 8 sessions      Subjective:   Subjective Questionnaire: QuickDASH: 43.18% disability; NDI 21/50  Clinical Progress: improved  Home Program Compliance: Yes  Treatment has included: therapeutic exercise, manual therapy, electrical stimulation, dry needling and moist heat    Subjective    Pj Zuleta reports: Pt reports that overall his symptoms are significantly improved. His headaches are less frequent/intense, numbness/tingling LUE improved. He is less sore overall. However he continues to complain of intermittent headache and blurred vision that most often occurs w/ prolonged computer work. He also continues to have back and neck soreness w/ any strenuous activity. He is able to get some relief of his headaches w/ his exercises and self STM using tennis balls as discussed during his visits.    Pre tx pn score: 5  Post tx pn score: 3      Treatment  Exercise 1  Exercise Name 1: Reassessment (24 min)  Exercise 2  Exercise Name 2: review HEP/POC (10 min)    Manual Rx 1  Manual Rx 1 Location: cervical  Manual Rx 1 Type: prone PA glides  Manual Rx 1 Grade: Gr II-III  Manual Rx 2  Manual Rx 2 Location: lumbar  Manual Rx 2 Type: prone PA glides  Manual Rx 2 Grade: Gr II-III        Objective          Static Posture     Head  Forward.    Shoulders  Rounded.    Palpation   Left   No palpable tenderness to the suboccipitals.   Tenderness of the cervical paraspinals, latissimus, lower trapezius, middle trapezius and upper trapezius.     Right   No palpable tenderness to the suboccipitals. Tenderness of the cervical paraspinals, latissimus, lower trapezius, middle trapezius and upper trapezius.     Neurological Testing     Sensation    Cervical/Thoracic   Left   Intact: light touch    Right   Intact: light touch    Active Range of Motion   Cervical/Thoracic Spine   Cervical    Flexion: 70 degrees   Extension: 60 degrees   Left lateral flexion: 45 degrees   Right lateral flexion: 45 degrees   Left rotation: 60 degrees with pain  Right rotation: 65 degrees     Additional Active Range of Motion Details  Reports midline cervical discomfort end range L rotation  Trunk Flxn 100% without pn; reports tightness B mid/low back  Trunk Extn 100% without pn  Trunk Rotation L 100% no pn R 100% w/ left sided mid back pn  Trunk SB L WFL w/o pn   R WFL w/o pn      Passive Range of Motion     Additional Passive Range of Motion Details  Reports reduction of HA after mobilization of upper cervical segments  Demo mild general hypomobility of cervical/thoracic/lumbar spine    Strength/Myotome Testing     Left Hip   Planes of Motion   Flexion: 4+  Abduction: 4+    Right Hip   Planes of Motion   Flexion: 5  Abduction: 5    Left Knee   Flexion: 5  Extension: 5    Right Knee   Flexion: 5  Extension: 5    Left Ankle/Foot   Dorsiflexion: 5  Plantar flexion: 5  Great toe extension: 5    Right Ankle/Foot   Dorsiflexion: 5  Plantar flexion: 5  Great toe extension: 5    Additional Strength Details  Left low/mid back pn w/ resisted L hip flxn    Tests   Cervical     Left   Negative active compression (Huxley), cervical distraction, Spurling's sign, ULTT1, ULTT2, ULTT3 and ULTT4.     Additional Tests Details  Flxn/rotation test (-) mild pn to L, but mobility WFL          Assessment & Plan     Assessment  Impairments: abnormal or restricted ROM, activity intolerance, impaired physical strength, lacks appropriate home exercise program and pain with function  Assessment details: Reassessment performed today. Pt has completed 8 PT visits. Interventions have included postural strengthening, stretching program, core stabilization, dry needling, and estim w/ MHP. Pt overall reports  significant improvement in headache, neck and back pn, and L UE paresthesia. He exhibits normal trunk/cervical mobility, improved strength and flexibility. He has gradually demonstrated improved tolerance to exercise and manual therapy techniques. He has had a good response to dry needling of the cervical paraspinals and suboccipital musculature, often reporting full or significant relief of headache afterward. He does however continue to complain of B upper trap soreness/tightness, intermittent headaches w/ blurred vision, and low back soreness especially w/ prolonged computer work and strenuous activity. Based on my clinical examination and his response to PT interventions I believe his headaches are tension type and seem to involve the suboccipitals. Reinforced self soft tissue mobilization using tennis balls, postural strengthening, and workplace ergonomics for continued symptom mgmt. Pt would like to discuss w/ MD tomorrow before making a decision regarding continued PT services.  Prognosis: good  Functional Limitations: carrying objects, lifting, sleeping, walking, pulling, pushing, uncomfortable because of pain, moving in bed, sitting, standing, reaching behind back, reaching overhead and unable to perform repetitive tasks  Goals  Plan Goals: STG 3 wks  1) Pt to be compliant w/ initial HEP for ROM, strength and symptom mgmt.-MET  2) Pt to report at least a 30% improvement in symptoms.-MET  3) Pt to report minimal to no discomfort w/ cervical mobility in all planes.-MET  4) Pt to perform single rep chin tuck/head lift w/o pn or compensation.-MET  5) Pt to improve NDI score to 22/50 or better to reflect improved pn and function.-MET  6) Pt to improve QD score to 30% impairment or better to reflect improved pn and function.-ONGOING  7) Pt to report decreased frequency/intensity of headaches.-PARTIALLY MET    LTG 6 wks  1) Pt to be independent w/ long term HEP and self mgmt.-ONGOING  2) Pt to report at least a  50% improvement in symptoms.-PROGRESSING  3) Pt to report minimal to no pn w/ full trunk mobility in all planes.-PROGRESSING  4) Pt to improve NDI score to 15/50 or better to reflect improved pn and function.-PROGRESSING  5) Pt to improve QD score to 20% impairment or better to reflect improved pn and function.-ONGOING    Plan  Therapy options: will be seen for skilled physical therapy services  Planned modality interventions: cryotherapy, thermotherapy (hydrocollator packs), TENS, traction, ultrasound and dry needling  Planned therapy interventions: flexibility, functional ROM exercises, home exercise program, manual therapy, joint mobilization, neuromuscular re-education, postural training, soft tissue mobilization, spinal/joint mobilization, strengthening, stretching, therapeutic activities, abdominal trunk stabilization and body mechanics training  Frequency: 2x week  Duration in visits: 8  Treatment plan discussed with: patient  Plan details: Will await MD recommendation. May consider ongoing dry needling on self pay basis      Progress toward previous goals: Partially Met        PT Signature: Annette Su, PT        Timed:  Manual Therapy:    8     mins  25658;  Therapeutic Exercise:    24     mins  00462;     Neuromuscular Preston:    0    mins  60055;    Therapeutic Activity:     10     mins  54142;     Gait Trainin     mins  69858;     Ultrasound:     0     mins  17798;    Electrical Stimulation:    0     mins  26250 ( );    Untimed:  Electrical Stimulation:    0     mins  89714 ( );  Mechanical Traction:    0     mins  21953;     Timed Treatment:   42   mins   Total Treatment:     45   mins

## 2020-12-03 ENCOUNTER — OFFICE VISIT (OUTPATIENT)
Dept: INTERNAL MEDICINE | Facility: CLINIC | Age: 45
End: 2020-12-03

## 2020-12-03 VITALS
HEIGHT: 72 IN | DIASTOLIC BLOOD PRESSURE: 86 MMHG | BODY MASS INDEX: 39.47 KG/M2 | SYSTOLIC BLOOD PRESSURE: 140 MMHG | RESPIRATION RATE: 18 BRPM | HEART RATE: 75 BPM | WEIGHT: 291.4 LBS | OXYGEN SATURATION: 99 % | TEMPERATURE: 97.3 F

## 2020-12-03 DIAGNOSIS — M25.511 CHRONIC RIGHT SHOULDER PAIN: Primary | ICD-10-CM

## 2020-12-03 DIAGNOSIS — G89.29 CHRONIC RIGHT SHOULDER PAIN: Primary | ICD-10-CM

## 2020-12-03 DIAGNOSIS — G89.29 CHRONIC NONINTRACTABLE HEADACHE, UNSPECIFIED HEADACHE TYPE: ICD-10-CM

## 2020-12-03 DIAGNOSIS — R51.9 CHRONIC NONINTRACTABLE HEADACHE, UNSPECIFIED HEADACHE TYPE: ICD-10-CM

## 2020-12-03 PROCEDURE — 99214 OFFICE O/P EST MOD 30 MIN: CPT | Performed by: INTERNAL MEDICINE

## 2020-12-03 RX ORDER — NORTRIPTYLINE HYDROCHLORIDE 50 MG/1
50 CAPSULE ORAL NIGHTLY
Qty: 30 CAPSULE | Refills: 3 | Status: SHIPPED | OUTPATIENT
Start: 2020-12-03 | End: 2021-01-13
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
## 2021-01-13 ENCOUNTER — OFFICE VISIT (OUTPATIENT)
Dept: INTERNAL MEDICINE | Facility: CLINIC | Age: 46
End: 2021-01-13

## 2021-01-13 VITALS
WEIGHT: 290.5 LBS | TEMPERATURE: 98 F | BODY MASS INDEX: 39.4 KG/M2 | DIASTOLIC BLOOD PRESSURE: 90 MMHG | HEART RATE: 85 BPM | RESPIRATION RATE: 20 BRPM | OXYGEN SATURATION: 98 % | SYSTOLIC BLOOD PRESSURE: 138 MMHG

## 2021-01-13 DIAGNOSIS — R51.9 CHRONIC NONINTRACTABLE HEADACHE, UNSPECIFIED HEADACHE TYPE: ICD-10-CM

## 2021-01-13 DIAGNOSIS — M54.2 NECK PAIN: Primary | ICD-10-CM

## 2021-01-13 DIAGNOSIS — G89.29 CHRONIC NONINTRACTABLE HEADACHE, UNSPECIFIED HEADACHE TYPE: ICD-10-CM

## 2021-01-13 PROCEDURE — 99214 OFFICE O/P EST MOD 30 MIN: CPT | Performed by: INTERNAL MEDICINE

## 2021-01-13 RX ORDER — NORTRIPTYLINE HYDROCHLORIDE 75 MG/1
CAPSULE ORAL
Qty: 30 CAPSULE | Refills: 3 | Status: SHIPPED | OUTPATIENT
Start: 2021-01-13 | End: 2022-07-30

## 2021-01-13 NOTE — PROGRESS NOTES
Subjective   Pj Zuleta is a 45 y.o. male.     History of Present Illness     The following portions of the patient's history were reviewed and updated as appropriate: allergies, current medications, past family history, past medical history, past social history, past surgical history and problem list.    1 headache- chronic and worsening patient says that he tried the nortriptyline 50 mg p.o. nightly and this has provided some minimal improvement but he continues to have headaches on a consistent basis.  Photophobia is associated with him  .    Review of Systems   All other systems reviewed and are negative.      Objective   Physical Exam  Vitals signs and nursing note reviewed.   Constitutional:       Appearance: Normal appearance.   HENT:      Head: Normocephalic and atraumatic.      Nose: Nose normal.      Mouth/Throat:      Mouth: Mucous membranes are moist.   Eyes:      Extraocular Movements: Extraocular movements intact.      Pupils: Pupils are equal, round, and reactive to light.   Cardiovascular:      Pulses: Normal pulses.      Heart sounds: Normal heart sounds.   Pulmonary:      Effort: Pulmonary effort is normal.      Breath sounds: Normal breath sounds.   Abdominal:      General: Bowel sounds are normal.      Palpations: Abdomen is soft.   Neurological:      Mental Status: He is alert.           Assessment/Plan   Diagnoses and all orders for this visit:    1. Neck pain (Primary)-continue with medical management    2. Chronic nonintractable headache, unspecified headache type  (medication increase)  -     nortriptyline (PAMELOR) 75 MG capsule; Take one tablet po qhs  Dispense: 30 capsule; Refill: 3  -     Ambulatory Referral to Neurology

## 2021-01-25 ENCOUNTER — TELEPHONE (OUTPATIENT)
Dept: INTERNAL MEDICINE | Facility: CLINIC | Age: 46
End: 2021-01-25

## 2021-01-25 DIAGNOSIS — R51.9 CHRONIC NONINTRACTABLE HEADACHE, UNSPECIFIED HEADACHE TYPE: Primary | ICD-10-CM

## 2021-01-25 DIAGNOSIS — G89.29 CHRONIC NONINTRACTABLE HEADACHE, UNSPECIFIED HEADACHE TYPE: Primary | ICD-10-CM

## 2021-01-25 NOTE — TELEPHONE ENCOUNTER
PATIENT CALLED ASKING TO HAVE A REFERRAL SENT TO NEUROLOGIST CHELO GARCIA [address] [place] [address] PHONE NUMBER [phone].    PLEASE CALL PATIENT WHEN THIS REFERRAL HAS BEEN SENT -[phone]

## 2021-01-28 ENCOUNTER — TELEPHONE (OUTPATIENT)
Dept: INTERNAL MEDICINE | Facility: CLINIC | Age: 46
End: 2021-01-28

## 2021-01-28 NOTE — TELEPHONE ENCOUNTER
----- Message from Carl Chiang MD sent at 1/27/2021  4:07 AM EST -----  Regarding: Neurology referral  Please tell patient that the referral has been placed and someone will call him with an appointment date and time

## 2021-03-01 ENCOUNTER — DOCUMENTATION (OUTPATIENT)
Dept: PHYSICAL THERAPY | Facility: CLINIC | Age: 46
End: 2021-03-01

## 2021-03-01 ENCOUNTER — TREATMENT (OUTPATIENT)
Dept: PHYSICAL THERAPY | Facility: CLINIC | Age: 46
End: 2021-03-01

## 2021-03-01 DIAGNOSIS — M54.12 RADICULOPATHY, CERVICAL: ICD-10-CM

## 2021-03-01 DIAGNOSIS — G44.86 CERVICOGENIC HEADACHE: Primary | ICD-10-CM

## 2021-03-01 DIAGNOSIS — G89.29 CHRONIC BILATERAL LOW BACK PAIN WITHOUT SCIATICA: ICD-10-CM

## 2021-03-01 DIAGNOSIS — M54.50 CHRONIC BILATERAL LOW BACK PAIN WITHOUT SCIATICA: ICD-10-CM

## 2021-03-01 PROCEDURE — 97162 PT EVAL MOD COMPLEX 30 MIN: CPT | Performed by: PHYSICAL THERAPIST

## 2021-03-01 PROCEDURE — 97110 THERAPEUTIC EXERCISES: CPT | Performed by: PHYSICAL THERAPIST

## 2021-03-01 NOTE — PROGRESS NOTES
Discharge Summary  Discharge Summary from Physical Therapy Report    Patient: Pj Zuleta   : 1975  Diagnosis/ICD-10 Code:  The primary encounter diagnosis was Cervicogenic headache. A diagnosis of Chronic bilateral low back pain without sciatica was also pertinent to this visit.   Referring practitioner: No ref. provider found  Date of Initial Visit: Type: THERAPY  Noted: 2020  Today's Date: 3/1/2021      Number of Visits: 8     Date of Discharge: 2021    Goals: Partially Met    Assessment/Reason for Discharge: did not return for additional follow ups    Discharge Plan: Continue with current home exercise program as instructed            Annette Su, PT  Physical Therapist
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
## 2021-03-10 ENCOUNTER — TREATMENT (OUTPATIENT)
Dept: PHYSICAL THERAPY | Facility: CLINIC | Age: 46
End: 2021-03-10

## 2021-03-10 DIAGNOSIS — G44.86 CERVICOGENIC HEADACHE: Primary | ICD-10-CM

## 2021-03-10 DIAGNOSIS — M54.12 RADICULOPATHY, CERVICAL: ICD-10-CM

## 2021-03-10 PROCEDURE — 97012 MECHANICAL TRACTION THERAPY: CPT | Performed by: PHYSICAL THERAPIST

## 2021-03-10 PROCEDURE — 97110 THERAPEUTIC EXERCISES: CPT | Performed by: PHYSICAL THERAPIST

## 2021-03-10 NOTE — PROGRESS NOTES
Physical Therapy Daily Progress Note  Visit: 2      Patient: Pj Zuleta   : 1975  Referring practitioner: Carl Chiang MD  Visit Diagnosis:     ICD-10-CM ICD-9-CM   1. Cervicogenic headache  R51.9 784.0   2. Radiculopathy, cervical  M54.12 723.4     Date of Initial Visit: Type: THERAPY  Noted: 3/1/2021  Today's Date: 3/10/2021        Subjective     Pj Zuleta reports: Reports improved movement w/ nerve glides but no change in UE symptoms. Has moderate headache, N/T medial arm from mid brachium to ulnar side of hand this morning.    Treatment  Pre Treatment Pn Score: 5  Post Treatment Pn Score:  3    Exercise 1  Exercise Name 1: seated ulnar nerve glides-head neutral  Sets/Reps 1: 15  Exercise 2  Exercise Name 2: seated nerve glides-ipsilateral SB  Sets/Reps 2: 15         Traction 50628  Traction Type: Cervical  Rx Minutes: 15  Duration: Intermittent  Position: Supine  Weight: 20  Hold: 30  Relax: 10  Progression: 1  Regression: 1  Dry Needling 1  Region/Structure 1: suboccipital  Technique 1: winding  Depth 1: .5 in  Needle Size 1: .5 in  Quantity of Needles 1: 4  Response 1: deep ache, tolerated well  Dry Needling 2  Region/Structure 2: B UT (spinal accessory HNTrP, pincer grasp technique)  Technique 2: in/out  Depth 2: 1 in  Needle Size 2: 1 in  Quantity of Needles 2: 2  Response 2: deep ache, L UT twitching, tolerated well  Other Treatment Provided  Rx Minutes: 15  Traction Type: Cervical  Duration: Intermittent  Position: Supine  Weight: 20  Hold: 30  Relax: 10  Progression: 1  Regression: 1     Objective       Assessment & Plan     Assessment  Assessment details: Compliant w/ initial HEP, performing all exercise appropriately. Able to perform neurodynamic exercises through full ROM w/ decreased reported tension compared to his previous visit. N/T however unchanged w/ HEP. Initiated mechanical cervical traction due to good response w/ manual traction at last visit. Immediately afterward  pt reported reduced intensity of both HA and N/T, neck tightness. Also incorporated DN to the UT, lower cervical paravertebrals, and suboccipitals in attempt to reduce muscle tension. Afterward pt reported near full resolution of HA, decreased tightness B UT. Discussed continuing w/ current HEP. No changes made.     Plan  Plan details: Assess response to traction, dry needling and continue w/ current intervention               Timed:  Manual Therapy:    0     mins  81094;  Therapeutic Exercise:    15     mins  22632;     Neuromuscular Preston:    0    mins  31305;    Therapeutic Activity:     0     mins  91006;     Gait Trainin     mins  35674;     Ultrasound:     0     mins  78257;    Electrical Stimulation:    0     mins  80477 ( );    Untimed:  Electrical Stimulation:    0     mins  44158 ( );  Mechanical Traction:    15     mins  57735;     Timed Treatment:   15   mins   Total Treatment:     36   mins      Annette Su, PT  Physical Therapist

## 2021-03-18 ENCOUNTER — TREATMENT (OUTPATIENT)
Dept: PHYSICAL THERAPY | Facility: CLINIC | Age: 46
End: 2021-03-18

## 2021-03-18 DIAGNOSIS — M54.12 RADICULOPATHY, CERVICAL: ICD-10-CM

## 2021-03-18 DIAGNOSIS — G44.86 CERVICOGENIC HEADACHE: Primary | ICD-10-CM

## 2021-03-18 PROCEDURE — 97012 MECHANICAL TRACTION THERAPY: CPT | Performed by: PHYSICAL THERAPIST

## 2021-03-18 PROCEDURE — 97110 THERAPEUTIC EXERCISES: CPT | Performed by: PHYSICAL THERAPIST

## 2021-03-18 NOTE — PROGRESS NOTES
"   Physical Therapy Daily Progress Note  Visit: 3      Patient: Pj Zuleta   : 1975  Referring practitioner: Carl Chiang MD  Visit Diagnosis:     ICD-10-CM ICD-9-CM   1. Cervicogenic headache  R51.9 784.0   2. Radiculopathy, cervical  M54.12 723.4     Date of Initial Visit: Type: THERAPY  Noted: 3/1/2021  Today's Date: 3/18/2021        Subjective     Pj Zuleta reports: Pt states that symptoms are about the same. Had relief for a few days after his last visit. However states after getting home from work he is \"floored\" by his HA. HA is constant, low grade but worsens w/ computer use/work. He gets some relief w/ stretching but minimal.    Treatment  Pre Treatment Pn Score: 5  Post Treatment Pn Score:  1    Exercise 1  Exercise Name 1: seated ulnar nerve glides-ipsilateral SB  Sets/Reps 1: 15  Exercise 2  Exercise Name 2: seated median nerve glides-ipsilateral SB  Sets/Reps 2: 15  Exercise 3  Exercise Name 3: supine chin tucks  Sets/Reps 3: 15  Exercise 4  Exercise Name 4: supine chin tuck w/ retraction  Sets/Reps 4: 5  Time 4: 10 sec  Exercise 5  Exercise Name 5: suboccipital stretching  Sets/Reps 5: 3  Time 5: 15 sec         Traction 31977  Traction Type: Cervical  Rx Minutes: 15  Duration: Intermittent  Position: Supine  Weight: 20  Hold: 30  Relax: 10  Progression: 1  Regression: 1    Dry Needling 1  Region/Structure 1: suboccipital  Technique 1: winding  Depth 1: .5 in  Needle Size 1: .5 in  Quantity of Needles 1: 6  Response 1: deep ache, tolerated well    Dry Needling 2  Region/Structure 2: B UT (spinal accessory HNTrP, pincer grasp technique)  Technique 2: in/out  Depth 2: 1 in  Needle Size 2: 1 in  Quantity of Needles 2: 4  Response 2: deep ache, L UT twitching, tolerated well    Dry Needling 3  Region/Structure 3: B C3-5 cervical paravertebrals   Technique 3: in/out  Depth 3: .75 in  Needle Size 3: 1 in  Quantity of Needles 3: 4  Response 3: tolerated well      Objective     "   Assessment & Plan     Assessment  Assessment details: Pt subjectively reports several days relief of HA, muscle tension, and more mild relief of UE paresthesia after mechanical traction and dry needling last visit. He has a good grasp on how to modify nerve glides for comfort. Discussed adding in contralateral SB to induce nerve tensioning as tolerated. Also revisited DNF exercises previously discussed and encouraged him to re-incorporate into his HEP, focusing on progression to 10 sec hold x10 reps w/ light pressure for appropriate activation. He again reported significant symptom relief w/ dry needling/mechanical cervical traction.    Plan  Plan details: Assess DNF exercises, progress as able.               Timed:  Manual Therapy:    0     mins  48187;  Therapeutic Exercise:    24     mins  22859;     Neuromuscular Preston:    0    mins  79541;    Therapeutic Activity:     0     mins  66923;     Gait Trainin     mins  55149;     Ultrasound:     0     mins  71715;    Electrical Stimulation:    0     mins  23322 ( );    Untimed:  Electrical Stimulation:    0     mins  24186 ( );  Mechanical Traction:    15     mins  28518;     Timed Treatment:   24   mins   Total Treatment:     46   mins      Annette Su, PT  Physical Therapist

## 2021-03-23 ENCOUNTER — TREATMENT (OUTPATIENT)
Dept: PHYSICAL THERAPY | Facility: CLINIC | Age: 46
End: 2021-03-23

## 2021-03-23 DIAGNOSIS — G44.86 CERVICOGENIC HEADACHE: Primary | ICD-10-CM

## 2021-03-23 PROCEDURE — 97110 THERAPEUTIC EXERCISES: CPT | Performed by: PHYSICAL THERAPIST

## 2021-03-23 PROCEDURE — 97012 MECHANICAL TRACTION THERAPY: CPT | Performed by: PHYSICAL THERAPIST

## 2021-03-23 NOTE — PROGRESS NOTES
Physical Therapy Daily Progress Note  Visit: 4      Patient: Pj Zuleta   : 1975  Referring practitioner: Carl Chiang MD  Visit Diagnosis:     ICD-10-CM ICD-9-CM   1. 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 By 11:30am-12 pm had onset of B eye pn, headache. Does believe that GOMEZ may be less frequent than before. Gets some relief working on stretches, tennis ball massage, self traction. Has not experienced any change in arm numbness. Remains most prominent along medial arm down to his pinky/ring/middle fingers.    Treatment  Pre Treatment Pn Score: 0  Post Treatment Pn Score:  0      Exercise 1  Exercise Name 1: supine chin tucks w/ retraction  Sets/Reps 1: 10  Time: 10 sec  Exercise 2  Exercise Name 2: self fist traction review  Exercise 3  Exercise Name 3: seated ulnar nerve glides  Sets/Reps 3: 15         Cervical Traction  Intermittent  Supine  Weight 20 lbs  Hold 30 sec/10 sec  15 min    Dry Needling 1  Region/Structure 1: suboccipital  Technique 1: winding  Depth 1: .5 in  Needle Size 1: .5 in  Quantity of Needles 1: 6  Response 1: deep ache, tolerated well    Dry Needling 2  Region/Structure 2: L C7-8 paravertebrals  Technique 2: in/out  Depth 2: .75-1 in  Needle Size 2: 1 in  Quantity of Needles 2: 2  Response 2: tolerated well    Dry Needling 3  Region/Structure 3: L ulnar nerve pathway-arcade of struthers  Technique 3: in/out  Depth 3: 1 in  Needle Size 3: 1 in  Quantity of Needles 3: 1  Response 3: tolerated well    Dry Needling 4  Region/Structure 4: L ulnar nerve pathway-FCU  Technique 4: in/out  Depth 4: 1 in  Needle Size 4: 1 in  Quantity of Needles 4: 1  Response 4: tolerated well      Objective      Significant TTP suboccipitals, cervical paravertebrals most prominent along  lower segments     Assessment & Plan     Assessment  Assessment details: Pt continues to experience HA relief w/ traction, dry needling. He subjectively reports some improvement in HA frequency, though intensity so far unchanged. No reported improvement in LUE paresthesia. Pt reports diminished sensation along medial upper arm, forearm, ulnar border of hand, and 4th, 5th, medial half of 3rd digit indicating peripheral ulnar neuropathy. Incorporated DN along ulnar nerve pathway which he tolerated well. No immediate change. Reinforced self traction, nerve gliding, DNF strengthening. Pt returns to neuro this week.    Plan  Plan details: Continue per MD recommendation. Reassess next visit               Timed:  Manual Therapy:    0     mins  02578;  Therapeutic Exercise:    15     mins  79216;     Neuromuscular Preston:    0    mins  23536;    Therapeutic Activity:     0     mins  77558;     Gait Trainin     mins  55360;     Ultrasound:     0     mins  96031;    Electrical Stimulation:    0     mins  95267 ( );    Untimed:  Electrical Stimulation:    0     mins  97433 ( );  Mechanical Traction:    15     mins  86201;     Timed Treatment:   15   mins   Total Treatment:     38   mins      Annette Su, PT  Physical Therapist

## 2021-03-30 ENCOUNTER — TREATMENT (OUTPATIENT)
Dept: PHYSICAL THERAPY | Facility: CLINIC | Age: 46
End: 2021-03-30

## 2021-03-30 DIAGNOSIS — G44.86 CERVICOGENIC HEADACHE: Primary | ICD-10-CM

## 2021-03-30 DIAGNOSIS — M54.12 RADICULOPATHY, CERVICAL: ICD-10-CM

## 2021-03-30 PROCEDURE — 97012 MECHANICAL TRACTION THERAPY: CPT | Performed by: PHYSICAL THERAPIST

## 2021-03-30 PROCEDURE — 97530 THERAPEUTIC ACTIVITIES: CPT | Performed by: PHYSICAL THERAPIST

## 2021-03-30 PROCEDURE — 97110 THERAPEUTIC EXERCISES: CPT | Performed by: PHYSICAL THERAPIST

## 2021-03-30 NOTE — PROGRESS NOTES
Re-Assessment / Re-Certification      Patient: Pj Zuleta   : 1975  Diagnosis/ICD-10 Code:  Cervicogenic headache [R51.9]  Referring practitioner: Carl Chiang MD  Date of Initial Visit: Type: THERAPY  Noted: 3/1/2021  Today's Date: 3/30/2021  Patient seen for 5 sessions      Subjective:   Subjective Questionnaire: NDI:  Clinical Progress: improved  Home Program Compliance: Yes  Treatment has included: therapeutic exercise, neuromuscular re-education, manual therapy, therapeutic activity, traction and dry needling    Subjective    Pj Zuleta reports: Pt followed up w/ neurologist, who told him recovery from his symptoms will likely take some time. He reports some improvement in headache, visual symptoms, but no change in UE symptoms. Continues to report of N/T, cold sensation throughout the L arm. Improves for a day or two w/ traction but then returns.    Pre tx pn score: 0  Post tx pn score: 0      Treatment  Exercise 1  Exercise Name 1: Reassessment  Exercise 2  Exercise Name 2: repeated retraction/extn  Sets/Reps 2: 10  Exercise 3  Exercise Name 3: ulnar nerve glides w/ contralateral SB  Sets/Reps 3: 20         Traction 77296  Traction Type: Cervical  Rx Minutes: 15  Duration: Intermittent  Position: Supine  Weight: 20  Hold: 30  Relax: 10  Progression: 1  Regression: 1    See log for Dry Needling tx      Objective          Static Posture     Head  Forward.    Shoulders  Rounded.    Tenderness     Additional Tenderness Details  Significant TTP throughout posterior cervical spine, most prominent along suboccipitals and L paravertebrals    Neurological Testing     Sensation   Cervical/Thoracic   Left   Diminished: light touch    Right   Intact: light touch    Comments   Left light touch: ulnar nerve distribution>median nerve distribution.     Reflexes   Left   Biceps (C5/C6): trace (1+)  Brachioradialis (C6): trace (1+)  Triceps (C7): trace (1+)    Right   Biceps (C5/C6): trace  (1+)  Brachioradialis (C6): trace (1+)  Triceps (C7): trace (1+)    Active Range of Motion   Cervical/Thoracic Spine   Cervical    Flexion: 50 degrees   Extension: 45 degrees   Left lateral flexion: 45 degrees   Right lateral flexion: 45 degrees   Left rotation: 45 degrees   Right rotation: 60 degrees     Additional Active Range of Motion Details  flxn-tightness L cervical-no change UE symptoms  SB-contralateral UT tightness  extn-reduces UE symptoms       Strength/Myotome Testing     Left Shoulder     Planes of Motion   Flexion: 5   Abduction: 4+   External rotation at 0°: 4+     Right Shoulder     Planes of Motion   Flexion: 5   Abduction: 4+   External rotation at 0°: 4+     Left Elbow   Flexion: 5  Extension: 5    Right Elbow   Flexion: 5  Extension: 5    Left Wrist/Hand   Wrist extension: 5  Wrist flexion: 5    Right Wrist/Hand   Wrist extension: 5  Wrist flexion: 5    Tests   Cervical   Positive repeated extension and repeated flexion.    Left   Positive active compression (Hooker), cervical distraction, Spurling's sign and ULTT4.   Negative ULTT1 and ULTT2.     Additional Tests Details  Cervical flxn/rotation (-)          Assessment & Plan     Assessment  Assessment details: Reassessment performed. Pt has completed 5 PT visits, interventions including cervical/neurological dry needling, manual therapy techniques, mechanical traction, postural strengthening, and neurodynamic mobility. Pt overall reports improvement in headache, visual disturbance but no significant change in UE paresthesia. He experiences relief of UE paresthesias w/ cervical traction, but returns after a day or two. He has a (+) Tinel sign at the cubital tunnel, but also (+) cervical compression/distraction/Spurlings tests. He also notes reduction in UE symptoms w/ sustained cervical extn. Continued today w/ traction, dry needling, nerve glides and instructed in repeated cervical retraction w/ extn, which was also added to his HEP. Recommend  continued PT services.    Goals  Plan Goals: STG 4 wks  1) Pt to be compliant w/ initial HEP for ROM, strength and symptom mgmt.-MET  2) Pt to report at least a 35% improvement in symptoms.-PARTIALLY MET (headache)  3) Pt to demonstrate functional cervical ROM in all planes w/ UE symptom reproduction.-ONGOING  4) Pt to improve NDI score to 14/50 or better to reflect improved pn and function.-ONGOING    LTG 8 wks  1) Pt to be independent w/ long term HEP and self mgmt.-ONGIONG  2) Pt to report at least a 70% improvement in symptoms.-ONGOING  3) Pt to improve NDI score to 8/50 or better to reflect improved pn and function.-ONGOING    Plan  Plan details: Continue w/ mechanical traction, dry needling, repeated movement exercises      Progress toward previous goals: Partially Met        PT Signature: Annette Su, PT        Timed:  Manual Therapy:    0     mins  53856;  Therapeutic Exercise:    8     mins  68295;     Neuromuscular Preston:    0    mins  88041;    Therapeutic Activity:     20     mins  80221;     Gait Trainin     mins  77457;     Ultrasound:     0     mins  95484;    Electrical Stimulation:    0     mins  36470 ( );    Untimed:  Electrical Stimulation:    0     mins  61011 ( );  Mechanical Traction:    15     mins  97127;     Timed Treatment:   28   mins   Total Treatment:     47   mins

## 2021-04-09 ENCOUNTER — TREATMENT (OUTPATIENT)
Dept: PHYSICAL THERAPY | Facility: CLINIC | Age: 46
End: 2021-04-09

## 2021-04-09 DIAGNOSIS — M54.12 RADICULOPATHY, CERVICAL: ICD-10-CM

## 2021-04-09 DIAGNOSIS — G44.86 CERVICOGENIC HEADACHE: Primary | ICD-10-CM

## 2021-04-09 PROCEDURE — 97012 MECHANICAL TRACTION THERAPY: CPT | Performed by: PHYSICAL THERAPIST

## 2021-04-09 PROCEDURE — 97110 THERAPEUTIC EXERCISES: CPT | Performed by: PHYSICAL THERAPIST

## 2021-04-13 ENCOUNTER — TREATMENT (OUTPATIENT)
Dept: PHYSICAL THERAPY | Facility: CLINIC | Age: 46
End: 2021-04-13

## 2021-04-13 DIAGNOSIS — M54.12 RADICULOPATHY, CERVICAL: ICD-10-CM

## 2021-04-13 DIAGNOSIS — G44.86 CERVICOGENIC HEADACHE: Primary | ICD-10-CM

## 2021-04-13 PROCEDURE — 97110 THERAPEUTIC EXERCISES: CPT | Performed by: PHYSICAL THERAPIST

## 2021-04-13 NOTE — PROGRESS NOTES
Physical Therapy Daily Progress Note  Visit: 7      Patient: Pj Zuleta   : 1975  Referring practitioner: Carl Chiang MD  Visit Diagnosis:     ICD-10-CM ICD-9-CM   1. Cervicogenic headache  R51.9 784.0   2. Radiculopathy, cervical  M54.12 723.4     Date of Initial Visit: Type: THERAPY  Noted: 3/1/2021  Today's Date: 2021        Subjective     Pj Zuleta reports: Overall reports improvement in headache, UE symptoms. Went to a comedy show over the weekend, had onset of severe headache, nausesa due to the lights and noise.    Treatment  Pre Treatment Pn Score: 0  Post Treatment Pn Score:  0      Exercise 1  Exercise Name 1: gaze stabilization w/ horizontal head turns  Exercise 2  Exercise Name 2: gaze stabilization w/ vertical head motion  Exercise 3  Exercise Name 3: Seated Gaze Stabilization with Head Rotation and Horizontal Arm Movement  Exercise 4  Exercise Name 4: seated horizontal smooth pursuit  Exercise 5  Exercise Name 5: seated VOR cancellation             Dry Needling 1  Region/Structure 1: suboccipital  Technique 1: winding  Depth 1: .5 in  Needle Size 1: .5 in  Quantity of Needles 1: 6  Response 1: deep ache, tolerated well    Dry Needling 2  Region/Structure 2: B UT (spinal accessory HNTrP, pincer grasp technique)  Technique 2: pistoning  Depth 2: 1 in  Needle Size 2: 1 in  Quantity of Needles 2: 2  Response 2: tolerated well    Dry Needling 3  Region/Structure 3: cervical paraspinals  Technique 3: in/out  Depth 3: .75 in  Needle Size 3: 1 in  Quantity of Needles 3: 4  Response 3: deep ache      Objective         Assessment & Plan     Assessment  Assessment details: Pt reports momentary improvement in LUE symptoms w/ repeated extn since his last visit. Also appears to reduce his HA. He has worked some on gaze stabilization exercises issued last visit. Spent more time in clinic today practicing various techniques and discussing progression based on symptom response.  Encouraged him to allow mild symptom provocation in order for his system to adapt. He verbalized understanding. He has not yet decided if he wants to proceed w/ obtaining a home traction unit.    Plan  Plan details: Pt to continue w/ his exercises independently at this time. He agrees to return if symptoms worsen or fail to improve. DN will be on self pay basis.               Timed:  Manual Therapy:    0     mins  14467;  Therapeutic Exercise:    24     mins  97588;     Neuromuscular Preston:    0    mins  95868;    Therapeutic Activity:     0     mins  27531;     Gait Trainin     mins  03795;     Ultrasound:     0     mins  76331;    Electrical Stimulation:    0     mins  61054 ( );    Untimed:  Electrical Stimulation:    0     mins  58879 (MC );  Mechanical Traction:    0     mins  99924;     Timed Treatment:   24   mins   Total Treatment:     30   mins      Annette Su, PT  Physical Therapist

## 2021-04-28 ENCOUNTER — TELEPHONE (OUTPATIENT)
Dept: INTERNAL MEDICINE | Facility: CLINIC | Age: 46
End: 2021-04-28

## 2021-04-28 RX ORDER — AMOXICILLIN 875 MG/1
875 TABLET, COATED ORAL 2 TIMES DAILY
Qty: 20 TABLET | Refills: 0 | Status: SHIPPED | OUTPATIENT
Start: 2021-04-28 | End: 2021-06-22

## 2021-04-28 NOTE — TELEPHONE ENCOUNTER
Caller: Pj Zuleta    Relationship: Self    Best call back number: [phone]     What medication are you requesting: ANTIBIOTIC    What are your current symptoms: NASAL PRESSURE,HEACHES, RUNNY NOSE    How long have you been experiencing symptoms: 2 DAY    Have you had these symptoms before:    [x] Yes  [] No    Have you been treated for these symptoms before:   [x] Yes  [] No    If a prescription is needed, what is your preferred pharmacy and phone number: [facility] #[id number] [address] [address] - [phone] [facility] [phone]      Additional notes:

## 2021-04-28 NOTE — TELEPHONE ENCOUNTER
Spoke to patient he stated that he has tried tylenol cold and sinus, benadryl, and Claritin and Singulair. He said he tried waiting it out for a couple days but is no better. Patient is requesting an abx called in.

## 2021-06-10 ENCOUNTER — TELEPHONE (OUTPATIENT)
Dept: INTERNAL MEDICINE | Facility: CLINIC | Age: 46
End: 2021-06-10

## 2021-06-10 NOTE — TELEPHONE ENCOUNTER
Spoke to patient he has been placed on schedule for next week for BP issues. I advised patient if he starts having symptoms or BP is higher then 180/100 he should go on to ER and not wait til his appointment next week. Patient verb good understanding.

## 2021-06-10 NOTE — TELEPHONE ENCOUNTER
Caller: Pj Zuleta    Relationship to patient: Self    Best call back number: [phone]     Chief complaint: 157/110 BLOOD PRESSURE 6/10/21, DISCOMFORT IN RIGHT TESTICLE     Type of visit: OFFICE VISIT     Requested date: ANY DAY     If rescheduling, when is the original appointment:     Additional notes: PATIENT STATES HE WAS SEEN AT THE OPTHOMOLOGIST 6/10/21 AND HAD HIGH BLOOD PRESSURE AND IS CONCERNED BECAUSE HIGH BLOOD PRESSURE IS OUT OF THE ORDINARY FOR HIM. PATIENT BEGAN HAVING DISCOMFORT IN HIS RIGHT TESTICLE 6/7/21.

## 2021-06-16 ENCOUNTER — OFFICE VISIT (OUTPATIENT)
Dept: INTERNAL MEDICINE | Facility: CLINIC | Age: 46
End: 2021-06-16

## 2021-06-16 VITALS
DIASTOLIC BLOOD PRESSURE: 90 MMHG | HEART RATE: 71 BPM | OXYGEN SATURATION: 98 % | WEIGHT: 296.5 LBS | BODY MASS INDEX: 40.21 KG/M2 | RESPIRATION RATE: 20 BRPM | SYSTOLIC BLOOD PRESSURE: 152 MMHG | TEMPERATURE: 97.1 F

## 2021-06-16 DIAGNOSIS — R03.0 ELEVATED BLOOD PRESSURE READING: Primary | ICD-10-CM

## 2021-06-16 DIAGNOSIS — Z13.1 DIABETES MELLITUS SCREENING: ICD-10-CM

## 2021-06-16 LAB
A/C: NORMAL
ALBUMIN SERPL-MCNC: 4.8 G/DL (ref 3.5–5.2)
ALBUMIN/GLOB SERPL: 1.7 G/DL
ALP SERPL-CCNC: 108 U/L (ref 39–117)
ALT SERPL W P-5'-P-CCNC: 37 U/L (ref 1–41)
ANION GAP SERPL CALCULATED.3IONS-SCNC: 10.4 MMOL/L (ref 5–15)
AST SERPL-CCNC: 22 U/L (ref 1–40)
BILIRUB SERPL-MCNC: 0.2 MG/DL (ref 0–1.2)
BUN SERPL-MCNC: 10 MG/DL (ref 6–20)
BUN/CREAT SERPL: 11.5 (ref 7–25)
CALCIUM SPEC-SCNC: 9 MG/DL (ref 8.6–10.5)
CHLORIDE SERPL-SCNC: 102 MMOL/L (ref 98–107)
CHOLEST SERPL-MCNC: 159 MG/DL (ref 0–200)
CO2 SERPL-SCNC: 27.6 MMOL/L (ref 22–29)
CREAT SERPL-MCNC: 0.87 MG/DL (ref 0.76–1.27)
DEPRECATED RDW RBC AUTO: 41.1 FL (ref 37–54)
ERYTHROCYTE [DISTWIDTH] IN BLOOD BY AUTOMATED COUNT: 12.9 % (ref 12.3–15.4)
EXPIRATION DATE: NORMAL
EXPIRATION DATE: NORMAL
GFR SERPL CREATININE-BSD FRML MDRD: 114 ML/MIN/1.73
GLOBULIN UR ELPH-MCNC: 2.9 GM/DL
GLUCOSE SERPL-MCNC: 73 MG/DL (ref 65–99)
HBA1C MFR BLD: 5.8 %
HCT VFR BLD AUTO: 48.2 % (ref 37.5–51)
HDLC SERPL-MCNC: 38 MG/DL (ref 40–60)
HGB BLD-MCNC: 16.1 G/DL (ref 13–17.7)
LDLC SERPL CALC-MCNC: 98 MG/DL (ref 0–100)
LDLC/HDLC SERPL: 2.52 {RATIO}
Lab: NORMAL
Lab: NORMAL
MCH RBC QN AUTO: 29.9 PG (ref 26.6–33)
MCHC RBC AUTO-ENTMCNC: 33.4 G/DL (ref 31.5–35.7)
MCV RBC AUTO: 89.4 FL (ref 79–97)
PLATELET # BLD AUTO: 210 10*3/MM3 (ref 140–450)
PMV BLD AUTO: 10.4 FL (ref 6–12)
POC CREATININE URINE: 200
POC MICROALBUMIN URINE: 30
POTASSIUM SERPL-SCNC: 4.2 MMOL/L (ref 3.5–5.2)
PROT SERPL-MCNC: 7.7 G/DL (ref 6–8.5)
RBC # BLD AUTO: 5.39 10*6/MM3 (ref 4.14–5.8)
SODIUM SERPL-SCNC: 140 MMOL/L (ref 136–145)
T4 FREE SERPL-MCNC: 1.01 NG/DL (ref 0.93–1.7)
TRIGL SERPL-MCNC: 126 MG/DL (ref 0–150)
TSH SERPL DL<=0.05 MIU/L-ACNC: 2.83 UIU/ML (ref 0.27–4.2)
VLDLC SERPL-MCNC: 23 MG/DL (ref 5–40)
WBC # BLD AUTO: 5.85 10*3/MM3 (ref 3.4–10.8)

## 2021-06-16 PROCEDURE — 80061 LIPID PANEL: CPT | Performed by: INTERNAL MEDICINE

## 2021-06-16 PROCEDURE — 99214 OFFICE O/P EST MOD 30 MIN: CPT | Performed by: INTERNAL MEDICINE

## 2021-06-16 PROCEDURE — 82044 UR ALBUMIN SEMIQUANTITATIVE: CPT | Performed by: INTERNAL MEDICINE

## 2021-06-16 PROCEDURE — 80053 COMPREHEN METABOLIC PANEL: CPT | Performed by: INTERNAL MEDICINE

## 2021-06-16 PROCEDURE — 84439 ASSAY OF FREE THYROXINE: CPT | Performed by: INTERNAL MEDICINE

## 2021-06-16 PROCEDURE — 83036 HEMOGLOBIN GLYCOSYLATED A1C: CPT | Performed by: INTERNAL MEDICINE

## 2021-06-16 PROCEDURE — 85027 COMPLETE CBC AUTOMATED: CPT | Performed by: INTERNAL MEDICINE

## 2021-06-16 PROCEDURE — 84443 ASSAY THYROID STIM HORMONE: CPT | Performed by: INTERNAL MEDICINE

## 2021-06-16 NOTE — PROGRESS NOTES
Subjective   Pj Zuleta is a 46 y.o. male.     History of Present Illness     The following portions of the patient's history were reviewed and updated as appropriate: allergies, current medications, past family history, past medical history, past social history, past surgical history and problem list.    1 elevated blood pressure readings-patient says that he was at the dentist when he had his blood pressure checked and his blood pressure was 150/80 later that week he checked his blood pressure and he would occasionally get 150s over 80 readings.  Patient denies any shortness of breath, chest pain, nausea, vomiting headache or fatigue, or any other sicknesses.  He is under a lot of stress lately dealing with issues at home    2 testicular discomfort  Duration 1 week  Sx patient says that previously he was doing some transferring of garden mulch and noticed the next subsequent day may have had some testicular discomfort in the right scrotal area.  Describes it as a achy or localized pain to the right scrotal but this has subsequently improved over the past few days but continues to have minor discomfort.    Family History   HTN    Social History     Review of Systems   All other systems reviewed and are negative.      Objective   Physical Exam  Vitals and nursing note reviewed.   Constitutional:       Appearance: Normal appearance. He is normal weight.   HENT:      Head: Normocephalic and atraumatic.      Nose: Nose normal.      Mouth/Throat:      Mouth: Mucous membranes are moist.   Eyes:      Extraocular Movements: Extraocular movements intact.      Pupils: Pupils are equal, round, and reactive to light.   Cardiovascular:      Rate and Rhythm: Normal rate.      Pulses: Normal pulses.   Pulmonary:      Effort: Pulmonary effort is normal.   Musculoskeletal:         General: Normal range of motion.      Cervical back: Normal range of motion and neck supple.   Skin:     General: Skin is warm.      Capillary  Refill: Capillary refill takes less than 2 seconds.   Neurological:      General: No focal deficit present.      Mental Status: He is alert.   Psychiatric:         Mood and Affect: Mood normal.         Behavior: Behavior normal.         Thought Content: Thought content normal.         Judgment: Judgment normal.           Assessment/Plan   Diagnoses and all orders for this visit:    1. Elevated blood pressure reading (Primary)  -     CBC (No Diff)  -     Comprehensive Metabolic Panel  -     T4, Free  -     TSH  -     Lipid Panel  -     POC Microalbumin  -     Cancel: ECG 12 Lead    2. Diabetes mellitus screening  -     POC Glycosylated Hemoglobin (Hb A1C)    3 testicular discomfort-recommend continued observation and follow-up.  Patient is clinically improving but continues to have mild residual effects.  We will discuss this and 2 to 3 weeks with a follow-up of blood pressure readings.      ECG 12 Lead    Date/Time: 6/16/2021 12:28 PM  Performed by: Carl Chiang MD  Authorized by: Carl Chiang MD           Order has been voided for EKG due to the fact that there were technical difficulties trying to obtain on this visit.

## 2021-06-22 ENCOUNTER — OFFICE VISIT (OUTPATIENT)
Dept: SLEEP MEDICINE | Facility: HOSPITAL | Age: 46
End: 2021-06-22

## 2021-06-22 VITALS
OXYGEN SATURATION: 94 % | BODY MASS INDEX: 40.23 KG/M2 | DIASTOLIC BLOOD PRESSURE: 94 MMHG | HEIGHT: 72 IN | HEART RATE: 72 BPM | SYSTOLIC BLOOD PRESSURE: 145 MMHG | WEIGHT: 297 LBS

## 2021-06-22 DIAGNOSIS — G47.33 OSA (OBSTRUCTIVE SLEEP APNEA): Primary | ICD-10-CM

## 2021-06-22 PROCEDURE — 99203 OFFICE O/P NEW LOW 30 MIN: CPT | Performed by: NURSE PRACTITIONER

## 2021-06-22 RX ORDER — TIZANIDINE 4 MG/1
4 TABLET ORAL NIGHTLY PRN
COMMUNITY
End: 2022-07-30

## 2021-06-22 NOTE — PROGRESS NOTES
Chief Complaint:   Chief Complaint   Patient presents with   • Sleeping Problem       HPI:    Pj Zuleta is a 46 y.o. male here to establish care as he has a past patient of Dr. Jaeger.  Patient sees Dr. Carl Chiang as his PCP.  Patient has been diagnosed with sleep apnea approximately 2 to 3 years ago.  He has not used it until last week for about 6 months as he was having difficulty getting supplies.  He did order a mask online and it did not seem to fit well.  When he does not wear CPAP he has snoring, witnessed apneas, daytime sleepiness, frequent awakenings, morning headaches, nocturia, lack of concentration.  When patient does wear his CPAP the symptoms are resolved.  He does go to bed at 11 30-12 30 during the week and will awaken at 615.  On the weekend he will go anywhere from 10 30-12 30 and get up from 6-7.  He estimates getting 5 hours a night.  He is not using CPAP it will take him 1-1/2 hours to go to sleep.  Patient is a  and since he is on summer break he has been taking a 30-minute nap each day.  Patient states this is not something he does has when he is working through the year.      Patient has never broken his nose.  Patient has never had a closed head injury.  Patient does not sleep walk, sleep talk, or moves or jerk his legs in the night.  Patient does not complain of any nightmares or night terrors.  Patient is eager to get back on CPAP therapy as he does feel much better using.    Past Medical History:   Diagnosis Date   • Asthma    • Hypertension      Family History   Problem Relation Age of Onset   • Heart attack Father    • Hypertension Father    • Lung cancer Father    • Alzheimer's disease Father    • Alcohol abuse Father    • Asthma Father    • Cancer Father      History reviewed. No pertinent surgical history.    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  Patient will smoke 1 to 2 cigars a month and will drink bourbon 2-4  times monthly.  Patient has no illicit drug use.  He will drink regular coffee 1 to 2 cups a week, regular tea 1 to 2 cups a week, and regular cola 1 to 2 cups a week.  Current medications are:   Current Outpatient Medications:   •  albuterol (ACCUNEB) 1.25 MG/3ML nebulizer solution, Take 3 mL by nebulization Every 6 (Six) Hours As Needed for Wheezing., Disp: 30 vial, Rfl: 3  •  albuterol sulfate  (90 Base) MCG/ACT inhaler, Inhale 2 puffs Every 4 (Four) Hours As Needed for Wheezing., Disp: 18 g, Rfl: 4  •  budesonide-formoterol (Symbicort) 160-4.5 MCG/ACT inhaler, Inhale 2 puffs 2 (Two) Times a Day., Disp: 1 inhaler, Rfl: 6  •  fluticasone (FLONASE) 50 MCG/ACT nasal spray, 2 sprays into the nostril(s) as directed by provider Daily., Disp: 1 bottle, Rfl: 3  •  ibuprofen (ADVIL,MOTRIN) 800 MG tablet, Take 1 tablet by mouth Every 6 (Six) Hours As Needed for Mild Pain ., Disp: 50 tablet, Rfl: 2  •  montelukast (SINGULAIR) 10 MG tablet, Take 1 tablet by mouth Daily., Disp: 30 tablet, Rfl: 6  •  nortriptyline (PAMELOR) 75 MG capsule, Take one tablet po qhs, Disp: 30 capsule, Rfl: 3  •  tiZANidine (ZANAFLEX) 4 MG tablet, Take 4 mg by mouth At Night As Needed for Muscle Spasms., Disp: , Rfl:   •  cyclobenzaprine (FLEXERIL) 10 MG tablet, Take 1 tablet by mouth 3 (Three) Times a Day As Needed for Muscle Spasms., Disp: 45 tablet, Rfl: 3.      The patient's relevant past medical, surgical, family and social history were reviewed and updated in Epic as appropriate.       Review of Systems   Respiratory: Positive for apnea and wheezing.    Allergic/Immunologic: Positive for environmental allergies.   Psychiatric/Behavioral: Positive for dysphoric mood and sleep disturbance. The patient is nervous/anxious.    All other systems reviewed and are negative.        Objective:    Physical Exam  Constitutional:       Appearance: Normal appearance. He is obese.   HENT:      Head: Normocephalic and atraumatic.      Mouth/Throat:       Mouth: Mucous membranes are moist.      Pharynx: Oropharynx is clear.      Comments: Mallampati 4 anatomy  Eyes:      Conjunctiva/sclera: Conjunctivae normal.      Pupils: Pupils are equal, round, and reactive to light.   Cardiovascular:      Rate and Rhythm: Normal rate and regular rhythm.   Pulmonary:      Effort: Pulmonary effort is normal.      Breath sounds: Normal breath sounds.   Skin:     General: Skin is dry.      Coloration: Skin is not pale.   Neurological:      Mental Status: He is alert and oriented to person, place, and time.   Psychiatric:         Mood and Affect: Mood normal.         Behavior: Behavior normal.         Thought Content: Thought content normal.         Judgment: Judgment normal.     5/30 days of use  Greater than 4-hour use 13.3  90% pressure 15.3  AHI of 0.3  Settings 10-18      ASSESSMENT/PLAN    Diagnoses and all orders for this visit:    1. YONY (obstructive sleep apnea) (Primary)  -     CPAP Therapy            1. Counseled patient regarding multimodal approach with healthy nutrition, healthy sleep, regular physical activity, social activities, counseling, and medications. Encouraged to practice lateral sleep position. Avoid alcohol and sedatives close to bedtime.  2. Refill supplies x1 year patient is encouraged to increase compliance and I will recheck this in approximately 8 weeks.    I have reviewed the results of my evaluation and impression and discussed my recommendations in detail with the patient.      Signed by  FALGUNI Smith    June 22, 2021      CC: Carl Chiang MD          No ref. provider found

## 2021-06-29 ENCOUNTER — TELEPHONE (OUTPATIENT)
Dept: INTERNAL MEDICINE | Facility: CLINIC | Age: 46
End: 2021-06-29

## 2021-07-12 RX ORDER — AMLODIPINE BESYLATE 5 MG/1
5 TABLET ORAL DAILY
Qty: 30 TABLET | Refills: 3 | Status: SHIPPED | OUTPATIENT
Start: 2021-07-12 | End: 2021-11-24

## 2021-08-25 ENCOUNTER — DOCUMENTATION (OUTPATIENT)
Dept: PHYSICAL THERAPY | Facility: CLINIC | Age: 46
End: 2021-08-25

## 2021-08-25 DIAGNOSIS — G44.86 CERVICOGENIC HEADACHE: Primary | ICD-10-CM

## 2021-08-25 NOTE — PROGRESS NOTES
Discharge Summary  Discharge Summary from Physical Therapy Report    Patient: Pj Zuleta   : 1975  Diagnosis/ICD-10 Code:  The encounter diagnosis was Cervicogenic headache.   Referring practitioner: Carl Chiang MD  Date of Initial Visit: Type: THERAPY  Noted: 3/1/2021  Today's Date: 2021      Number of Visits: 7     Date of Discharge: 2021    Goals: Partially Met    Assessment/Reason for Discharge: independent w/ HEP    Discharge Plan: Continue with current home exercise program as instructed            Annette Su, PT  Physical Therapist

## 2021-09-01 ENCOUNTER — OFFICE VISIT (OUTPATIENT)
Dept: INTERNAL MEDICINE | Facility: CLINIC | Age: 46
End: 2021-09-01

## 2021-09-01 VITALS
HEART RATE: 74 BPM | WEIGHT: 291.4 LBS | DIASTOLIC BLOOD PRESSURE: 86 MMHG | RESPIRATION RATE: 18 BRPM | OXYGEN SATURATION: 97 % | TEMPERATURE: 97.5 F | HEIGHT: 71 IN | SYSTOLIC BLOOD PRESSURE: 138 MMHG | BODY MASS INDEX: 40.8 KG/M2

## 2021-09-01 DIAGNOSIS — J45.20 MILD INTERMITTENT ASTHMA WITHOUT COMPLICATION: ICD-10-CM

## 2021-09-01 DIAGNOSIS — J30.1 SEASONAL ALLERGIC RHINITIS DUE TO POLLEN: ICD-10-CM

## 2021-09-01 DIAGNOSIS — Z12.5 SCREENING PSA (PROSTATE SPECIFIC ANTIGEN): Primary | ICD-10-CM

## 2021-09-01 LAB — PSA SERPL-MCNC: 0.61 NG/ML (ref 0–4)

## 2021-09-01 PROCEDURE — 99396 PREV VISIT EST AGE 40-64: CPT | Performed by: INTERNAL MEDICINE

## 2021-09-01 PROCEDURE — G0103 PSA SCREENING: HCPCS | Performed by: INTERNAL MEDICINE

## 2021-09-01 RX ORDER — FLUTICASONE PROPIONATE 50 MCG
2 SPRAY, SUSPENSION (ML) NASAL DAILY
Qty: 15.8 ML | Refills: 4 | Status: SHIPPED | OUTPATIENT
Start: 2021-09-01

## 2021-09-01 RX ORDER — BUDESONIDE AND FORMOTEROL FUMARATE DIHYDRATE 160; 4.5 UG/1; UG/1
2 AEROSOL RESPIRATORY (INHALATION)
Qty: 10.2 G | Refills: 4 | Status: SHIPPED | OUTPATIENT
Start: 2021-09-01 | End: 2022-03-01 | Stop reason: SDUPTHER

## 2021-09-01 RX ORDER — ALBUTEROL SULFATE 90 UG/1
2 AEROSOL, METERED RESPIRATORY (INHALATION) EVERY 4 HOURS PRN
Qty: 18 G | Refills: 4 | Status: SHIPPED | OUTPATIENT
Start: 2021-09-01 | End: 2022-03-01 | Stop reason: SDUPTHER

## 2021-09-01 NOTE — PROGRESS NOTES
"Chief Complaint  Annual Exam    Subjective    Pj Zuleta is a 46 y.o. male.     Pj Zuleta presents to [facility] for       History of Present Illness        Complete Adult Physical    1 HTN- chronic and controlled.  Patient denies any shortness of breath, chest pain, nausea, vomit, headache, fatigue, or any other sicknesses.    2 Asthma- chronic and controlled.  Has not had any recent exacerbations and overall is doing very well on current inhalers    3 seasonal allergies- chronic.  No exacerbations and doing well on the nasal steroids and over-the-counter medication.    4 weight loss/nutrition review-  Going to use the nutrtitional program called [employer]       Diet: Regular        Exercise: Minimal to none        Social History: No EtOH consumption, no IV drug use, no marijuana, no strokes.        Preventative Screenings  Colonoscopy done 2 to 3 years ago normal.        Immunizations: Received COVID-19 vaccine.          The following portions of the patient's history were reviewed and updated as appropriate: allergies, current medications, past family history, past medical history, past social history, past surgical history and problem list.    Review of Systems   All other systems reviewed and are negative.      Objective   Vital Signs:   /86   Pulse 74   Temp 97.5 °F (36.4 °C) (Infrared)   Resp 18   Ht 180.3 cm (71\")   Wt 132 kg (291 lb 6.4 oz)   SpO2 97%   BMI 40.64 kg/m²     Body mass index is 40.64 kg/m².    Physical Exam  Vitals and nursing note reviewed.   Constitutional:       Appearance: Normal appearance. He is normal weight.   HENT:      Head: Normocephalic and atraumatic.      Right Ear: Tympanic membrane, ear canal and external ear normal.      Left Ear: Tympanic membrane, ear canal and external ear normal.      Nose: Nose normal.      Mouth/Throat:      Mouth: Mucous membranes are moist.   Eyes:      Extraocular " Movements: Extraocular movements intact.      Pupils: Pupils are equal, round, and reactive to light.   Cardiovascular:      Rate and Rhythm: Normal rate and regular rhythm.      Pulses: Normal pulses.   Pulmonary:      Effort: Pulmonary effort is normal.      Breath sounds: Normal breath sounds.   Abdominal:      General: Bowel sounds are normal.      Palpations: Abdomen is soft.   Genitourinary:     Penis: Normal.       Testes: Normal.   Musculoskeletal:         General: Normal range of motion.      Cervical back: Normal range of motion and neck supple.   Skin:     General: Skin is warm and dry.      Capillary Refill: Capillary refill takes less than 2 seconds.   Neurological:      General: No focal deficit present.      Mental Status: He is alert.   Psychiatric:         Mood and Affect: Mood normal.         Behavior: Behavior normal.         Thought Content: Thought content normal.         Judgment: Judgment normal.               Assessment and Plan  Diagnoses and all orders for this visit:       Diagnoses and all orders for this visit:    1. Screening PSA (prostate specific antigen) (Primary)  -     PSA SCREENING    2. Mild intermittent asthma without complication  -     albuterol sulfate  (90 Base) MCG/ACT inhaler; Inhale 2 puffs Every 4 (Four) Hours As Needed for Wheezing.  Dispense: 18 g; Refill: 4    -     budesonide-formoterol (Symbicort) 160-4.5 MCG/ACT inhaler; Inhale 2 puffs 2 (Two) Times a Day.  Dispense: 10.2 g; Refill: 4    3. Seasonal allergic rhinitis due to pollen  -     fluticasone (FLONASE) 50 MCG/ACT nasal spray; 2 sprays into the nostril(s) as directed by provider Daily.  Dispense: 15.8 mL; Refill: 4    Anticipatory guidance:  Concussion headache syndrome-patient continues to have intermittent episodes of headache and diplopia patient has been doing well over the past 6 months since his head injury but continues to have mild visual changes.  Patient does have a concussion specialist in  [place] he will provide the contact with them and I will make an official referral for him.

## 2021-09-07 ENCOUNTER — OFFICE VISIT (OUTPATIENT)
Dept: SLEEP MEDICINE | Facility: HOSPITAL | Age: 46
End: 2021-09-07

## 2021-09-07 VITALS
DIASTOLIC BLOOD PRESSURE: 82 MMHG | HEART RATE: 86 BPM | OXYGEN SATURATION: 98 % | SYSTOLIC BLOOD PRESSURE: 124 MMHG | WEIGHT: 295 LBS | BODY MASS INDEX: 41.3 KG/M2 | HEIGHT: 71 IN

## 2021-09-07 DIAGNOSIS — G47.33 OBSTRUCTIVE APNEA: Primary | ICD-10-CM

## 2021-09-07 PROCEDURE — 99213 OFFICE O/P EST LOW 20 MIN: CPT | Performed by: NURSE PRACTITIONER

## 2021-09-07 NOTE — PROGRESS NOTES
Chief Complaint:   Chief Complaint   Patient presents with   • Follow-up       HPI:    Pj Zuleta is a 46 y.o. male here for follow-up of sleep apnea.  Patient was last seen 6/22/2021.  Patient had worn his CPAP as he was unable to get new supplies.  Patient states he is doing very well now that he is using new supplies sleeping 7 hours nightly and feeling rested upon awakening.  He will go to sleep within 10 minutes and does not get up during the night.  Patient has an Manter score of 7/24.  Patient has no concerns or complaints and wishes to continue CPAP therapy.        Current medications are:   Current Outpatient Medications:   •  albuterol (ACCUNEB) 1.25 MG/3ML nebulizer solution, Take 3 mL by nebulization Every 6 (Six) Hours As Needed for Wheezing., Disp: 30 vial, Rfl: 3  •  albuterol sulfate  (90 Base) MCG/ACT inhaler, Inhale 2 puffs Every 4 (Four) Hours As Needed for Wheezing., Disp: 18 g, Rfl: 4  •  amLODIPine (NORVASC) 5 MG tablet, Take 1 tablet by mouth Daily., Disp: 30 tablet, Rfl: 3  •  budesonide-formoterol (Symbicort) 160-4.5 MCG/ACT inhaler, Inhale 2 puffs 2 (Two) Times a Day., Disp: 10.2 g, Rfl: 4  •  fluticasone (FLONASE) 50 MCG/ACT nasal spray, 2 sprays into the nostril(s) as directed by provider Daily., Disp: 15.8 mL, Rfl: 4  •  ibuprofen (ADVIL,MOTRIN) 800 MG tablet, Take 1 tablet by mouth Every 6 (Six) Hours As Needed for Mild Pain ., Disp: 50 tablet, Rfl: 2  •  montelukast (SINGULAIR) 10 MG tablet, Take 1 tablet by mouth Daily., Disp: 30 tablet, Rfl: 6  •  nortriptyline (PAMELOR) 75 MG capsule, Take one tablet po qhs, Disp: 30 capsule, Rfl: 3  •  tiZANidine (ZANAFLEX) 4 MG tablet, Take 4 mg by mouth At Night As Needed for Muscle Spasms., Disp: , Rfl: .      The patient's relevant past medical, surgical, family and social history were reviewed and updated in Epic as appropriate.       Review of Systems   Eyes: Positive for visual disturbance.   Respiratory: Positive  for apnea, shortness of breath and wheezing.    Musculoskeletal: Positive for myalgias.   Allergic/Immunologic: Positive for environmental allergies.   Psychiatric/Behavioral: Positive for sleep disturbance.   All other systems reviewed and are negative.        Objective:    Physical Exam  Constitutional:       Appearance: Normal appearance.   HENT:      Head: Normocephalic and atraumatic.      Mouth/Throat:      Mouth: Mucous membranes are moist.      Pharynx: Oropharynx is clear.      Comments: Class 4 airway  Eyes:      Conjunctiva/sclera: Conjunctivae normal.      Pupils: Pupils are equal, round, and reactive to light.   Cardiovascular:      Rate and Rhythm: Normal rate and regular rhythm.   Pulmonary:      Effort: Pulmonary effort is normal.      Breath sounds: Normal breath sounds.   Skin:     General: Skin is warm and dry.   Neurological:      Mental Status: He is alert and oriented to person, place, and time.   Psychiatric:         Mood and Affect: Mood normal.         Behavior: Behavior normal.         Thought Content: Thought content normal.         Judgment: Judgment normal.     38/46 days of use  Greater than 4-hour use 72%  95th percentile pressure 14.4  AHI of 0.7  Settings 10-18      ASSESSMENT/PLAN    Diagnoses and all orders for this visit:    1. Obstructive apnea (Primary)  -     CPAP Therapy            1. Counseled patient regarding multimodal approach with healthy nutrition, healthy sleep, regular physical activity, social activities, counseling, and medications. Encouraged to practice lateral sleep position. Avoid alcohol and sedatives close to bedtime.  2.   Refill supplies x1 year.  Return to clinic 1 year or sooner symptoms warrant.  I have reviewed the results of my evaluation and impression and discussed my recommendations in detail with the patient.      Signed by  FALGUNI Smith    September 7, 2021      CC: Carl Chiang MD          No ref. provider found

## 2021-10-20 ENCOUNTER — TELEPHONE (OUTPATIENT)
Dept: INTERNAL MEDICINE | Facility: CLINIC | Age: 46
End: 2021-10-20

## 2021-10-20 ENCOUNTER — TELEMEDICINE (OUTPATIENT)
Dept: INTERNAL MEDICINE | Facility: CLINIC | Age: 46
End: 2021-10-20

## 2021-10-20 DIAGNOSIS — M25.551 ACUTE RIGHT HIP PAIN: Primary | ICD-10-CM

## 2021-10-20 PROCEDURE — 99213 OFFICE O/P EST LOW 20 MIN: CPT | Performed by: INTERNAL MEDICINE

## 2021-10-20 RX ORDER — TRAMADOL HYDROCHLORIDE 50 MG/1
50 TABLET ORAL EVERY 6 HOURS PRN
Qty: 50 TABLET | Refills: 2 | Status: SHIPPED | OUTPATIENT
Start: 2021-10-20 | End: 2022-07-30

## 2021-10-20 NOTE — TELEPHONE ENCOUNTER
Caller: Pj Zuleta    Relationship: Self    Best call back number: [phone]    What was the call regarding: PATIENT STATES THAT HE WOULD LIKE A CALL FROM THE NURSE ABOUT A REFERRAL.    Do you require a callback: YES

## 2021-10-20 NOTE — TELEPHONE ENCOUNTER
Spoke to patient he stated that he had mentioned the hip pain to you in the past but its never really been talked about. Patient was scheduled for a video visit today at 3:45

## 2021-10-20 NOTE — PROGRESS NOTES
Chief Complaint  No chief complaint on file.    Subjective    Pj Zuleta is a 46 y.o. male.     Pj Zuleta presents to [facility] for       History of Present Illness    The following portions of the patient's history were reviewed and updated as appropriate: allergies, current medications, past family history, past medical history, past social history, past surgical history and problem list.    Patient has consented for video Doximity    Right hip pain  Duration 2 months  She says that he has been having right-sided hip pain made worse with prolonged standing and flexion and it has affected his overall quality of life somewhat with decrease in activities of daily living.  Patient says that his been taking ibuprofen and Aleve and neither these medications have helped no fever, chills, nausea, vomiting or diarrhea, no other symptoms        Review of Systems   All other systems reviewed and are negative.      Objective   Vital Signs:   There were no vitals taken for this visit.    There is no height or weight on file to calculate BMI.    Physical Exam  Vitals and nursing note reviewed.   Constitutional:       Appearance: Normal appearance. He is normal weight.   HENT:      Head: Normocephalic and atraumatic.      Nose: Nose normal.      Mouth/Throat:      Mouth: Mucous membranes are moist.   Eyes:      Extraocular Movements: Extraocular movements intact.      Pupils: Pupils are equal, round, and reactive to light.   Pulmonary:      Effort: Pulmonary effort is normal.   Musculoskeletal:         General: Tenderness present.      Comments: Reported tenderness on the right hip with reasonable range of motion   Skin:     General: Skin is warm.   Neurological:      General: No focal deficit present.      Mental Status: He is alert.               Assessment and Plan  Diagnoses and all orders for this visit:    Diagnoses and all orders for this  visit:    1. Acute right hip pain (Primary)  -     traMADol (ULTRAM) 50 MG tablet; Take 1 tablet by mouth Every 6 (Six) Hours As Needed for Moderate Pain .  Dispense: 50 tablet; Refill: 2    -     Ambulatory Referral to Physical Therapy Evaluate and treat

## 2021-11-24 RX ORDER — AMLODIPINE BESYLATE 5 MG/1
5 TABLET ORAL DAILY
Qty: 30 TABLET | Refills: 3 | Status: SHIPPED | OUTPATIENT
Start: 2021-11-24 | End: 2022-04-12

## 2021-12-23 ENCOUNTER — TREATMENT (OUTPATIENT)
Dept: PHYSICAL THERAPY | Facility: CLINIC | Age: 46
End: 2021-12-23

## 2021-12-23 DIAGNOSIS — M25.559 PAIN, HIP: Primary | ICD-10-CM

## 2021-12-23 PROCEDURE — 97110 THERAPEUTIC EXERCISES: CPT | Performed by: PHYSICAL THERAPIST

## 2021-12-23 PROCEDURE — 97162 PT EVAL MOD COMPLEX 30 MIN: CPT | Performed by: PHYSICAL THERAPIST
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
## 2021-12-27 ENCOUNTER — TREATMENT (OUTPATIENT)
Dept: PHYSICAL THERAPY | Facility: CLINIC | Age: 46
End: 2021-12-27

## 2021-12-27 DIAGNOSIS — M25.559 PAIN, HIP: Primary | ICD-10-CM

## 2021-12-27 PROCEDURE — 97110 THERAPEUTIC EXERCISES: CPT | Performed by: PHYSICAL THERAPIST

## 2021-12-27 NOTE — PROGRESS NOTES
Physical Therapy Daily Progress Note  VISIT: 2          Subjective    Pj Zuleta reports: moderate R hip pain today.  He has been applying ice, and doing HEP.      Pre-treatment pain:  5  Post-treatment pain:  4      Objective    Treatment    Exercise 1  Exercise Name 1: prone quad/hip flexor stretch with strap  Exercise 2  Exercise Name 2: prone active and assisted knee flexion  Exercise 3  Exercise Name 3: supine hip flexor stretch  Exercise 4  Exercise Name 4: glider knee flexion with R LE off edge of table  Exercise 5  Exercise Name 5: crossed leg R hip ER                 Assessment & Plan     Assessment    Assessment details: Prone knee flexion ROM improved during session today.  Pt tolerated exercise progressions well.    Plan  Plan details: Continue PT.  Progress exercise as tolerated, incorporating strengthening with stretching when tolerated.               Timed:  Manual Therapy:         mins  67027;  Therapeutic Exercise:    30     mins  28658;     Neuromuscular Preston:        mins  02220;    Therapeutic Activity:          mins  72351;     Gait Training:           mins  41516;     Ultrasound:          mins  94431;    Electrical Stimulation:         mins  11383 ( );    Untimed:  Electrical Stimulation:         mins  19297 ( );  Mechanical Traction:         mins  72541;     Timed Treatment:   30   mins   Total Treatment:     30   mins      Bethany Johnson, PT  Physical Therapist

## 2022-01-13 ENCOUNTER — TREATMENT (OUTPATIENT)
Dept: PHYSICAL THERAPY | Facility: CLINIC | Age: 47
End: 2022-01-13

## 2022-01-13 DIAGNOSIS — M25.559 PAIN, HIP: Primary | ICD-10-CM

## 2022-01-13 PROCEDURE — 97110 THERAPEUTIC EXERCISES: CPT | Performed by: PHYSICAL THERAPIST

## 2022-01-13 NOTE — PROGRESS NOTES
Physical Therapy Daily Progress Note  VISIT: 3          Subjective    Pj Zuleta reports: pain is gradually lessening.  He does his stretches, does resistance exercises in gym to tolerance, and applies ice.      Pre-treatment pain:  3  Post-treatment pain:  3      Objective    Treatment    Exercise 1  Exercise Name 1: bicycle  Time: 5'  Exercise 2  Exercise Name 2: standing hip/knee flexion  Equipment/Resistance 2: red band (irritating to symptoms)  Exercise 3  Exercise Name 3: standing straight leg hip flexion  Equipment/Resistance 3: red band (irritating after a few repetitions)  Exercise 4  Exercise Name 4: supine hip flexor/quad stretch  Exercise 5  Exercise Name 5: sidelying R hip abduction  Exercise 6  Exercise Name 6: bridging  Exercise 7  Exercise Name 7: crossed leg R hip ER/IR stretches  Exercise 8  Exercise Name 8: prone hip flexor/quad stretch with strap                 Assessment & Plan     Assessment    Assessment details: Pain intensity is gradually decreasing.  Pt tolerated most exercises well, but was not ready to begin resisted strengthening of involved muscle today.    Plan  Plan details: Continue PT.  Progress exercise as tolerated.               Timed:  Manual Therapy:         mins  66433;  Therapeutic Exercise:    30     mins  39319;     Neuromuscular Preston:        mins  57317;    Therapeutic Activity:          mins  70059;     Gait Training:           mins  54603;     Ultrasound:          mins  11841;    Electrical Stimulation:         mins  18296 ( );    Untimed:  Electrical Stimulation:         mins  90850 ( );  Mechanical Traction:         mins  58580;     Timed Treatment:   30   mins   Total Treatment:     30   mins      Bethany Johnson, PT  Physical Therapist

## 2022-02-15 ENCOUNTER — DOCUMENTATION (OUTPATIENT)
Dept: PHYSICAL THERAPY | Facility: CLINIC | Age: 47
End: 2022-02-15

## 2022-02-15 NOTE — PROGRESS NOTES
Discharge Summary  Discharge Summary from Physical Therapy Report      Patient: Pj Zuleta   : 1975  Diagnosis/ICD-10 Code:  There were no encounter diagnoses.   Referring practitioner: No ref. provider found  Date of Initial Visit: No linked episodes  Today's Date: 2/15/2022  Date of Last Visit: 2022     Number of Visits: 3    Discharge Status of Patient: cancelled last four scheduled appointments    Goals: progressing toward goals when last seen for treatment    Discharge Plan: Continue with current home exercise program as instructed    Comments:  Discharged per attendance policy    Date of Discharge: 2/15/2022        Bethany Johnson, PT

## 2022-03-01 ENCOUNTER — OFFICE VISIT (OUTPATIENT)
Dept: INTERNAL MEDICINE | Facility: CLINIC | Age: 47
End: 2022-03-01

## 2022-03-01 VITALS
HEART RATE: 66 BPM | HEIGHT: 71 IN | WEIGHT: 292.4 LBS | OXYGEN SATURATION: 98 % | BODY MASS INDEX: 40.94 KG/M2 | TEMPERATURE: 97.8 F | SYSTOLIC BLOOD PRESSURE: 140 MMHG | DIASTOLIC BLOOD PRESSURE: 86 MMHG | RESPIRATION RATE: 20 BRPM

## 2022-03-01 DIAGNOSIS — M25.551 ACUTE RIGHT HIP PAIN: ICD-10-CM

## 2022-03-01 DIAGNOSIS — F32.A DEPRESSION, UNSPECIFIED DEPRESSION TYPE: ICD-10-CM

## 2022-03-01 DIAGNOSIS — J45.20 MILD INTERMITTENT ASTHMA WITHOUT COMPLICATION: ICD-10-CM

## 2022-03-01 PROCEDURE — 99214 OFFICE O/P EST MOD 30 MIN: CPT | Performed by: INTERNAL MEDICINE

## 2022-03-01 RX ORDER — ALBUTEROL SULFATE 1.25 MG/3ML
1 SOLUTION RESPIRATORY (INHALATION) EVERY 6 HOURS PRN
Qty: 30 EACH | Refills: 1 | Status: SHIPPED | OUTPATIENT
Start: 2022-03-01

## 2022-03-01 RX ORDER — ALBUTEROL SULFATE 90 UG/1
2 AEROSOL, METERED RESPIRATORY (INHALATION) EVERY 4 HOURS PRN
Qty: 18 G | Refills: 4 | Status: SHIPPED | OUTPATIENT
Start: 2022-03-01 | End: 2022-03-01 | Stop reason: SDUPTHER

## 2022-03-01 RX ORDER — DULOXETIN HYDROCHLORIDE 30 MG/1
30 CAPSULE, DELAYED RELEASE ORAL DAILY
Qty: 90 CAPSULE | Refills: 2 | Status: SHIPPED | OUTPATIENT
Start: 2022-03-01

## 2022-03-01 RX ORDER — BUDESONIDE AND FORMOTEROL FUMARATE DIHYDRATE 160; 4.5 UG/1; UG/1
2 AEROSOL RESPIRATORY (INHALATION)
Qty: 10.2 G | Refills: 4 | Status: SHIPPED | OUTPATIENT
Start: 2022-03-01

## 2022-03-01 RX ORDER — ALBUTEROL SULFATE 90 UG/1
2 AEROSOL, METERED RESPIRATORY (INHALATION) EVERY 4 HOURS PRN
Qty: 18 G | Refills: 4 | Status: SHIPPED | OUTPATIENT
Start: 2022-03-01 | End: 2022-08-08 | Stop reason: SDUPTHER

## 2022-03-01 NOTE — PROGRESS NOTES
"Chief Complaint  Hip Pain (6 month f/u (moderate pain and tightness) )    Subjective    Pj Zuleta is a 47 y.o. male.     Pj Zuleta presents to [facility] for       History of Present Illness    The following portions of the patient's history were reviewed and updated as appropriate: allergies, current medications, past family history, past medical history, past social history, past surgical history and problem list.    1 asthma-medication refills and doing well.  Patient denies any exacerbations of any shortness of breath, any coughing, any wheezing, or any other systemic signs.    2 exercising and doing more physical activity     3 right hip- he has been doing the physical therapy and he has made some mild improvement but continues to have some pain but it has improved somewhat and so he would like to continue his therapy at this time    4 depression/stress- \"Patient says that he is having a lot of stress with family issue\" patient says that he thinks he needs to talk to someone because it has been a lot on his mind and he is feeling less motivated, down, decreased mood.  Patient would like to go back on the Cymbalta patient denies any suicidal ideations or emotional liability threats towards himself    Review of Systems   Psychiatric/Behavioral: Positive for decreased concentration, sleep disturbance and depressed mood. Negative for hallucinations, self-injury and negative for hyperactivity. The patient is not nervous/anxious.    All other systems reviewed and are negative.      Objective   Vital Signs:   /86 (BP Location: Right arm, Patient Position: Sitting, Cuff Size: Adult)   Pulse 66   Temp 97.8 °F (36.6 °C) (Infrared)   Resp 20   Ht 180.3 cm (71\")   Wt 133 kg (292 lb 6.4 oz)   SpO2 98%   BMI 40.78 kg/m²     Body mass index is 40.78 kg/m².    Physical Exam  Vitals and nursing note reviewed.   Constitutional:       " Appearance: Normal appearance. He is normal weight.   HENT:      Head: Normocephalic.      Nose: Nose normal.      Mouth/Throat:      Mouth: Mucous membranes are moist.   Eyes:      Extraocular Movements: Extraocular movements intact.      Pupils: Pupils are equal, round, and reactive to light.   Cardiovascular:      Rate and Rhythm: Normal rate and regular rhythm.      Pulses: Normal pulses.      Heart sounds: Normal heart sounds.   Pulmonary:      Effort: Pulmonary effort is normal.   Musculoskeletal:      Cervical back: Normal range of motion and neck supple.   Skin:     General: Skin is warm.      Capillary Refill: Capillary refill takes less than 2 seconds.   Neurological:      General: No focal deficit present.      Mental Status: He is alert.   Psychiatric:         Mood and Affect: Mood normal.         Behavior: Behavior normal.         Thought Content: Thought content normal.         Judgment: Judgment normal.               Assessment and Plan  Diagnoses and all orders for this visit:    Diagnoses and all orders for this visit:    1. Mild intermittent asthma without complication  -     Discontinue: albuterol sulfate  (90 Base) MCG/ACT inhaler; Inhale 2 puffs Every 4 (Four) Hours As Needed for Wheezing.  Dispense: 18 g; Refill: 4  -     budesonide-formoterol (Symbicort) 160-4.5 MCG/ACT inhaler; Inhale 2 puffs 2 (Two) Times a Day.  Dispense: 10.2 g; Refill: 4  -     albuterol sulfate  (90 Base) MCG/ACT inhaler; Inhale 2 puffs Every 4 (Four) Hours As Needed for Wheezing.  Dispense: 18 g; Refill: 4    2. Acute right hip pain-continue with physical therapy    3. Depression, unspecified depression type  (Restart on)  -     DULoxetine (Cymbalta) 30 MG capsule; Take 1 capsule by mouth Daily.  Dispense: 90 capsule; Refill: 2  -     Ambulatory Referral to Behavioral Health    Other orders  -     albuterol (ACCUNEB) 1.25 MG/3ML nebulizer solution; Take 3 mL by nebulization Every 6 (Six) Hours As Needed  for Wheezing.  Dispense: 30 each; Refill: 1

## 2022-04-12 RX ORDER — AMLODIPINE BESYLATE 5 MG/1
5 TABLET ORAL DAILY
Qty: 30 TABLET | Refills: 3 | Status: SHIPPED | OUTPATIENT
Start: 2022-04-12 | End: 2022-08-22

## 2022-04-26 NOTE — TELEPHONE ENCOUNTER
Occupational Therapy  Co Treatment    Name: Pelon Vasquez  MRN: [id number]  Admitting Diagnosis:  S/P liver transplant  3 Days Post-Op    Recommendations:     Discharge Recommendations: home health OT, home health PT  Discharge Equipment Recommendations:  bedside commode, walker, rolling      Assessment:     Pelon Vasquez is a 53 y.o. male with a medical diagnosis of S/P liver transplant. . Performance deficits affecting function are impaired endurance, impaired self care skills, impaired functional mobilty, gait instability, impaired balance, pain. Pt tolerated session well with good performance and functional progress this date. Continue to anticipate pt will be appropriate for d/c home with family support when medically stable.     Rehab Prognosis:  Good; patient would benefit from acute skilled OT services to address these deficits and reach maximum level of function.       Plan:     Patient to be seen 4 x/week to address the above listed problems via self-care/home management, therapeutic activities, therapeutic exercises  · Plan of Care Expires: 05/24/22  · Plan of Care Reviewed with: patient, spouse    Subjective   Pt agreeable to therapy.     Pain/Comfort:  ·  5/10 abdominal pain.  · Pt was premedicated. Repositioning and distraction provided.     Objective:     Communicated with: nsg prior to session.  Patient found in chair with spouse present. Pt with GO drains, francisco, tele, pulse ox and BP cuff.     General Precautions: Standard, fall     Occupational Performance:     Functional Mobility/Transfers:  · Sit>stand with SBA. Pt slow to rise and slow transition back to sitting.     Activities of Daily Living:  · Feeding: independent  · G/H: standing with SBA for wash face/oral care. Pt tolerated standing self care approx 10 min   · UE/LE dressing:  MAX A simulated   ·       AMPAC 6 Click ADL: 15    Treatment & Education:  Pt completed functional mobility in room with SBA and RW. Pt moves slowly with all  Ok to send records?   transitions.   Pt tolerated standing g/h skills with good B UE integration and adequate standing balance.   Education provided re: OT POC and safety with functional mobility/ADl skills.       Patient left up in chair with all lines intact, call button in reach, nsg notified and spouse presentEducation:      GOALS:   Multidisciplinary Problems     Occupational Therapy Goals        Problem: Occupational Therapy    Goal Priority Disciplines Outcome Interventions   Occupational Therapy Goal     OT, PT/OT Ongoing, Progressing    Description: Goals to be met by: 5/9/2022    Patient will increase functional independence with ADLs by performing:    UE Dressing with Modified [place].  LE Dressing with Modified [place].  Grooming while standing at sink with Modified [place].  Toileting from toilet with Modified [place] for hygiene and clothing management.   Toilet transfer to toilet with Modified [place].                     Time Tracking:     OT Date of Treatment: 04/26/22  OT Start Time: 0834  OT Stop Time: 0857  OT Total Time (min): 23 min    Billable Minutes:Self Care/Home Management 10  Therapeutic Activity 13    OT/AMARIS: OT          4/26/2022

## 2022-06-06 ENCOUNTER — TELEPHONE (OUTPATIENT)
Dept: INTERNAL MEDICINE | Facility: CLINIC | Age: 47
End: 2022-06-06
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
## 2022-06-06 NOTE — TELEPHONE ENCOUNTER
Caller: Pj Zuleta    Relationship: Self    Best call back number: [phone]    What is the best time to reach you: anytime    Who are you requesting to speak with (clinical staff, provider,  specific staff member): Dr. Chiang    Do you know the name of the person who called:     What was the call regarding: patient needs a statement from the provider indicating that prior to 2020 patient was NOT on any blood pressure medications.   Patient would like to have this as quickly as possible     Do you require a callback: YES

## 2022-06-07 NOTE — TELEPHONE ENCOUNTER
Patient was able to get letter off mychart. Patient does not need it faxed now. He said thank you

## 2022-07-29 PROCEDURE — 87086 URINE CULTURE/COLONY COUNT: CPT | Performed by: FAMILY MEDICINE

## 2022-07-29 PROCEDURE — U0004 COV-19 TEST NON-CDC HGH THRU: HCPCS | Performed by: FAMILY MEDICINE

## 2022-07-30 ENCOUNTER — APPOINTMENT (OUTPATIENT)
Dept: GENERAL RADIOLOGY | Facility: HOSPITAL | Age: 47
End: 2022-07-30

## 2022-07-30 ENCOUNTER — HOSPITAL ENCOUNTER (OUTPATIENT)
Facility: HOSPITAL | Age: 47
Discharge: HOME | End: 2022-08-01
Attending: EMERGENCY MEDICINE | Admitting: INTERNAL MEDICINE

## 2022-07-30 ENCOUNTER — APPOINTMENT (OUTPATIENT)
Dept: CT IMAGING | Facility: HOSPITAL | Age: 47
End: 2022-07-30

## 2022-07-30 DIAGNOSIS — J18.9 SEPSIS DUE TO PNEUMONIA: Primary | ICD-10-CM

## 2022-07-30 DIAGNOSIS — A41.9 SEPSIS DUE TO PNEUMONIA: Primary | ICD-10-CM

## 2022-07-30 DIAGNOSIS — D72.829 LEUKOCYTOSIS, UNSPECIFIED TYPE: ICD-10-CM

## 2022-07-30 PROBLEM — J45.20 MILD INTERMITTENT ASTHMA: Status: ACTIVE | Noted: 2022-07-30

## 2022-07-30 PROBLEM — I10 HTN (HYPERTENSION): Status: ACTIVE | Noted: 2022-07-30

## 2022-07-30 LAB
ALBUMIN SERPL-MCNC: 4.2 G/DL (ref 3.5–5.2)
ALBUMIN/GLOB SERPL: 1 G/DL
ALP SERPL-CCNC: 114 U/L (ref 39–117)
ALT SERPL W P-5'-P-CCNC: 20 U/L (ref 1–41)
ANION GAP SERPL CALCULATED.3IONS-SCNC: 14 MMOL/L (ref 5–15)
AST SERPL-CCNC: 15 U/L (ref 1–40)
BACTERIA UR QL AUTO: ABNORMAL /HPF
BASOPHILS # BLD AUTO: 0.05 10*3/MM3 (ref 0–0.2)
BASOPHILS NFR BLD AUTO: 0.3 % (ref 0–1.5)
BILIRUB SERPL-MCNC: 1.3 MG/DL (ref 0–1.2)
BILIRUB UR QL STRIP: ABNORMAL
BUN SERPL-MCNC: 12 MG/DL (ref 6–20)
BUN/CREAT SERPL: 10.7 (ref 7–25)
CALCIUM SPEC-SCNC: 9.1 MG/DL (ref 8.6–10.5)
CHLORIDE SERPL-SCNC: 100 MMOL/L (ref 98–107)
CLARITY UR: ABNORMAL
CO2 SERPL-SCNC: 22 MMOL/L (ref 22–29)
COLOR UR: ABNORMAL
CREAT SERPL-MCNC: 1.12 MG/DL (ref 0.76–1.27)
CRP SERPL-MCNC: 21.9 MG/DL (ref 0–0.5)
D DIMER PPP FEU-MCNC: 0.81 MCGFEU/ML (ref 0.01–0.5)
D-LACTATE SERPL-SCNC: 1.9 MMOL/L (ref 0.5–2)
DEPRECATED RDW RBC AUTO: 43.8 FL (ref 37–54)
EGFRCR SERPLBLD CKD-EPI 2021: 80.5 ML/MIN/1.73
EOSINOPHIL # BLD AUTO: 0 10*3/MM3 (ref 0–0.4)
EOSINOPHIL NFR BLD AUTO: 0 % (ref 0.3–6.2)
ERYTHROCYTE [DISTWIDTH] IN BLOOD BY AUTOMATED COUNT: 13.3 % (ref 12.3–15.4)
ERYTHROCYTE [SEDIMENTATION RATE] IN BLOOD: 61 MM/HR (ref 0–15)
FLUAV RNA RESP QL NAA+PROBE: NOT DETECTED
FLUBV RNA RESP QL NAA+PROBE: NOT DETECTED
GLOBULIN UR ELPH-MCNC: 4.2 GM/DL
GLUCOSE SERPL-MCNC: 121 MG/DL (ref 65–99)
GLUCOSE UR STRIP-MCNC: NEGATIVE MG/DL
HBA1C MFR BLD: 6 % (ref 4.8–5.6)
HCT VFR BLD AUTO: 48.1 % (ref 37.5–51)
HGB BLD-MCNC: 16.2 G/DL (ref 13–17.7)
HGB UR QL STRIP.AUTO: ABNORMAL
HOLD SPECIMEN: NORMAL
HYALINE CASTS UR QL AUTO: ABNORMAL /LPF
IMM GRANULOCYTES # BLD AUTO: 0.12 10*3/MM3 (ref 0–0.05)
IMM GRANULOCYTES NFR BLD AUTO: 0.6 % (ref 0–0.5)
KETONES UR QL STRIP: ABNORMAL
LDH SERPL-CCNC: 194 U/L (ref 135–225)
LEUKOCYTE ESTERASE UR QL STRIP.AUTO: ABNORMAL
LIPASE SERPL-CCNC: 17 U/L (ref 13–60)
LYMPHOCYTES # BLD AUTO: 2.11 10*3/MM3 (ref 0.7–3.1)
LYMPHOCYTES NFR BLD AUTO: 10.6 % (ref 19.6–45.3)
MAGNESIUM SERPL-MCNC: 1.9 MG/DL (ref 1.6–2.6)
MCH RBC QN AUTO: 30.2 PG (ref 26.6–33)
MCHC RBC AUTO-ENTMCNC: 33.7 G/DL (ref 31.5–35.7)
MCV RBC AUTO: 89.6 FL (ref 79–97)
MONOCYTES # BLD AUTO: 2.07 10*3/MM3 (ref 0.1–0.9)
MONOCYTES NFR BLD AUTO: 10.4 % (ref 5–12)
NEUTROPHILS NFR BLD AUTO: 15.64 10*3/MM3 (ref 1.7–7)
NEUTROPHILS NFR BLD AUTO: 78.1 % (ref 42.7–76)
NITRITE UR QL STRIP: NEGATIVE
NRBC BLD AUTO-RTO: 0 /100 WBC (ref 0–0.2)
NT-PROBNP SERPL-MCNC: 51.1 PG/ML (ref 0–450)
PH UR STRIP.AUTO: 5.5 [PH] (ref 5–8)
PLATELET # BLD AUTO: 205 10*3/MM3 (ref 140–450)
PMV BLD AUTO: 9.5 FL (ref 6–12)
POTASSIUM SERPL-SCNC: 4.2 MMOL/L (ref 3.5–5.2)
PROCALCITONIN SERPL-MCNC: 1.84 NG/ML (ref 0–0.25)
PROT SERPL-MCNC: 8.4 G/DL (ref 6–8.5)
PROT UR QL STRIP: ABNORMAL
QT INTERVAL: 304 MS
QT INTERVAL: 366 MS
QTC INTERVAL: 422 MS
QTC INTERVAL: 455 MS
RBC # BLD AUTO: 5.37 10*6/MM3 (ref 4.14–5.8)
RBC # UR STRIP: ABNORMAL /HPF
REF LAB TEST METHOD: ABNORMAL
SARS-COV-2 RNA RESP QL NAA+PROBE: NOT DETECTED
SODIUM SERPL-SCNC: 136 MMOL/L (ref 136–145)
SP GR UR STRIP: 1.03 (ref 1–1.03)
SQUAMOUS #/AREA URNS HPF: ABNORMAL /HPF
TROPONIN T SERPL-MCNC: <0.01 NG/ML (ref 0–0.03)
TROPONIN T SERPL-MCNC: <0.01 NG/ML (ref 0–0.03)
UROBILINOGEN UR QL STRIP: ABNORMAL
WBC # UR STRIP: ABNORMAL /HPF
WBC NRBC COR # BLD: 19.99 10*3/MM3 (ref 3.4–10.8)
WHOLE BLOOD HOLD COAG: NORMAL
WHOLE BLOOD HOLD SPECIMEN: NORMAL

## 2022-07-30 PROCEDURE — 87636 SARSCOV2 & INF A&B AMP PRB: CPT | Performed by: EMERGENCY MEDICINE

## 2022-07-30 PROCEDURE — 96365 THER/PROPH/DIAG IV INF INIT: CPT

## 2022-07-30 PROCEDURE — 0 IOPAMIDOL PER 1 ML: Performed by: EMERGENCY MEDICINE

## 2022-07-30 PROCEDURE — 83615 LACTATE (LD) (LDH) ENZYME: CPT | Performed by: EMERGENCY MEDICINE

## 2022-07-30 PROCEDURE — 84484 ASSAY OF TROPONIN QUANT: CPT | Performed by: EMERGENCY MEDICINE

## 2022-07-30 PROCEDURE — 84145 PROCALCITONIN (PCT): CPT | Performed by: EMERGENCY MEDICINE

## 2022-07-30 PROCEDURE — 87040 BLOOD CULTURE FOR BACTERIA: CPT | Performed by: EMERGENCY MEDICINE

## 2022-07-30 PROCEDURE — 81001 URINALYSIS AUTO W/SCOPE: CPT | Performed by: EMERGENCY MEDICINE

## 2022-07-30 PROCEDURE — 85379 FIBRIN DEGRADATION QUANT: CPT | Performed by: EMERGENCY MEDICINE

## 2022-07-30 PROCEDURE — 83036 HEMOGLOBIN GLYCOSYLATED A1C: CPT | Performed by: NURSE PRACTITIONER

## 2022-07-30 PROCEDURE — 99285 EMERGENCY DEPT VISIT HI MDM: CPT

## 2022-07-30 PROCEDURE — 83880 ASSAY OF NATRIURETIC PEPTIDE: CPT | Performed by: EMERGENCY MEDICINE

## 2022-07-30 PROCEDURE — 83605 ASSAY OF LACTIC ACID: CPT | Performed by: EMERGENCY MEDICINE

## 2022-07-30 PROCEDURE — 86140 C-REACTIVE PROTEIN: CPT | Performed by: EMERGENCY MEDICINE

## 2022-07-30 PROCEDURE — 99223 1ST HOSP IP/OBS HIGH 75: CPT | Performed by: HOSPITALIST

## 2022-07-30 PROCEDURE — 25010000002 CEFTRIAXONE PER 250 MG: Performed by: NURSE PRACTITIONER

## 2022-07-30 PROCEDURE — 25010000002 PIPERACILLIN SOD-TAZOBACTAM PER 1 G: Performed by: EMERGENCY MEDICINE

## 2022-07-30 PROCEDURE — 96361 HYDRATE IV INFUSION ADD-ON: CPT

## 2022-07-30 PROCEDURE — 36415 COLL VENOUS BLD VENIPUNCTURE: CPT

## 2022-07-30 PROCEDURE — 25010000002 VANCOMYCIN 10 G RECONSTITUTED SOLUTION: Performed by: EMERGENCY MEDICINE

## 2022-07-30 PROCEDURE — 83690 ASSAY OF LIPASE: CPT | Performed by: EMERGENCY MEDICINE

## 2022-07-30 PROCEDURE — 96372 THER/PROPH/DIAG INJ SC/IM: CPT

## 2022-07-30 PROCEDURE — 85025 COMPLETE CBC W/AUTO DIFF WBC: CPT | Performed by: EMERGENCY MEDICINE

## 2022-07-30 PROCEDURE — 99284 EMERGENCY DEPT VISIT MOD MDM: CPT

## 2022-07-30 PROCEDURE — 83735 ASSAY OF MAGNESIUM: CPT | Performed by: EMERGENCY MEDICINE

## 2022-07-30 PROCEDURE — 25010000002 HEPARIN (PORCINE) PER 1000 UNITS: Performed by: NURSE PRACTITIONER

## 2022-07-30 PROCEDURE — 71045 X-RAY EXAM CHEST 1 VIEW: CPT

## 2022-07-30 PROCEDURE — 80053 COMPREHEN METABOLIC PANEL: CPT | Performed by: EMERGENCY MEDICINE

## 2022-07-30 PROCEDURE — 93005 ELECTROCARDIOGRAM TRACING: CPT | Performed by: EMERGENCY MEDICINE

## 2022-07-30 PROCEDURE — 85652 RBC SED RATE AUTOMATED: CPT | Performed by: EMERGENCY MEDICINE

## 2022-07-30 PROCEDURE — 71275 CT ANGIOGRAPHY CHEST: CPT

## 2022-07-30 RX ORDER — ACETAMINOPHEN 650 MG/1
650 SUPPOSITORY RECTAL EVERY 4 HOURS PRN
Status: DISCONTINUED | OUTPATIENT
Start: 2022-07-30 | End: 2022-07-30

## 2022-07-30 RX ORDER — BISACODYL 5 MG/1
5 TABLET, DELAYED RELEASE ORAL DAILY PRN
Status: DISCONTINUED | OUTPATIENT
Start: 2022-07-30 | End: 2022-08-01 | Stop reason: HOSPADM

## 2022-07-30 RX ORDER — ACETAMINOPHEN 325 MG/1
650 TABLET ORAL EVERY 4 HOURS PRN
Status: DISCONTINUED | OUTPATIENT
Start: 2022-07-30 | End: 2022-08-01 | Stop reason: HOSPADM

## 2022-07-30 RX ORDER — SODIUM CHLORIDE 0.9 % (FLUSH) 0.9 %
10 SYRINGE (ML) INJECTION AS NEEDED
Status: DISCONTINUED | OUTPATIENT
Start: 2022-07-30 | End: 2022-08-01 | Stop reason: HOSPADM

## 2022-07-30 RX ORDER — ASPIRIN 81 MG/1
324 TABLET, CHEWABLE ORAL ONCE
Status: COMPLETED | OUTPATIENT
Start: 2022-07-30 | End: 2022-07-30

## 2022-07-30 RX ORDER — AMLODIPINE BESYLATE 5 MG/1
5 TABLET ORAL DAILY
Status: DISCONTINUED | OUTPATIENT
Start: 2022-07-30 | End: 2022-08-01 | Stop reason: HOSPADM

## 2022-07-30 RX ORDER — ONDANSETRON 2 MG/ML
4 INJECTION INTRAMUSCULAR; INTRAVENOUS EVERY 6 HOURS PRN
Status: DISCONTINUED | OUTPATIENT
Start: 2022-07-30 | End: 2022-08-01 | Stop reason: HOSPADM

## 2022-07-30 RX ORDER — MONTELUKAST SODIUM 10 MG/1
10 TABLET ORAL DAILY
Status: DISCONTINUED | OUTPATIENT
Start: 2022-07-31 | End: 2022-08-01 | Stop reason: HOSPADM

## 2022-07-30 RX ORDER — CHOLECALCIFEROL (VITAMIN D3) 125 MCG
500 CAPSULE ORAL DAILY
COMMUNITY

## 2022-07-30 RX ORDER — ACETAMINOPHEN 160 MG/5ML
650 SOLUTION ORAL EVERY 4 HOURS PRN
Status: DISCONTINUED | OUTPATIENT
Start: 2022-07-30 | End: 2022-08-01 | Stop reason: HOSPADM

## 2022-07-30 RX ORDER — AMOXICILLIN 250 MG
2 CAPSULE ORAL 2 TIMES DAILY
Status: DISCONTINUED | OUTPATIENT
Start: 2022-07-30 | End: 2022-08-01 | Stop reason: HOSPADM

## 2022-07-30 RX ORDER — ACETAMINOPHEN 325 MG/1
650 TABLET ORAL EVERY 4 HOURS PRN
Status: DISCONTINUED | OUTPATIENT
Start: 2022-07-30 | End: 2022-07-30

## 2022-07-30 RX ORDER — HEPARIN SODIUM 5000 [USP'U]/ML
5000 INJECTION, SOLUTION INTRAVENOUS; SUBCUTANEOUS EVERY 8 HOURS SCHEDULED
Status: DISCONTINUED | OUTPATIENT
Start: 2022-07-30 | End: 2022-08-01 | Stop reason: HOSPADM

## 2022-07-30 RX ORDER — ALBUTEROL SULFATE 1.25 MG/3ML
1 SOLUTION RESPIRATORY (INHALATION) EVERY 6 HOURS PRN
Status: DISCONTINUED | OUTPATIENT
Start: 2022-07-30 | End: 2022-08-01 | Stop reason: HOSPADM

## 2022-07-30 RX ORDER — SODIUM CHLORIDE 0.9 % (FLUSH) 0.9 %
10 SYRINGE (ML) INJECTION EVERY 12 HOURS SCHEDULED
Status: DISCONTINUED | OUTPATIENT
Start: 2022-07-30 | End: 2022-08-01 | Stop reason: HOSPADM

## 2022-07-30 RX ORDER — ACETAMINOPHEN 160 MG/5ML
650 SOLUTION ORAL EVERY 4 HOURS PRN
Status: DISCONTINUED | OUTPATIENT
Start: 2022-07-30 | End: 2022-07-30

## 2022-07-30 RX ORDER — ACETAMINOPHEN 325 MG/1
325 TABLET ORAL EVERY 6 HOURS PRN
COMMUNITY

## 2022-07-30 RX ORDER — GUAIFENESIN 600 MG/1
600 TABLET, EXTENDED RELEASE ORAL EVERY 12 HOURS SCHEDULED
Status: DISCONTINUED | OUTPATIENT
Start: 2022-07-30 | End: 2022-08-01 | Stop reason: HOSPADM

## 2022-07-30 RX ORDER — BISACODYL 10 MG
10 SUPPOSITORY, RECTAL RECTAL DAILY PRN
Status: DISCONTINUED | OUTPATIENT
Start: 2022-07-30 | End: 2022-08-01 | Stop reason: HOSPADM

## 2022-07-30 RX ORDER — DULOXETIN HYDROCHLORIDE 30 MG/1
30 CAPSULE, DELAYED RELEASE ORAL DAILY
Status: DISCONTINUED | OUTPATIENT
Start: 2022-07-31 | End: 2022-08-01 | Stop reason: HOSPADM

## 2022-07-30 RX ORDER — ALBUTEROL SULFATE 90 UG/1
2 AEROSOL, METERED RESPIRATORY (INHALATION) EVERY 4 HOURS PRN
Status: DISCONTINUED | OUTPATIENT
Start: 2022-07-30 | End: 2022-07-30 | Stop reason: SDUPTHER

## 2022-07-30 RX ORDER — POLYETHYLENE GLYCOL 3350 17 G/17G
17 POWDER, FOR SOLUTION ORAL DAILY PRN
Status: DISCONTINUED | OUTPATIENT
Start: 2022-07-30 | End: 2022-08-01 | Stop reason: HOSPADM

## 2022-07-30 RX ORDER — SODIUM CHLORIDE 9 MG/ML
100 INJECTION, SOLUTION INTRAVENOUS CONTINUOUS
Status: ACTIVE | OUTPATIENT
Start: 2022-07-30 | End: 2022-07-31

## 2022-07-30 RX ORDER — ACETAMINOPHEN 650 MG/1
650 SUPPOSITORY RECTAL EVERY 4 HOURS PRN
Status: DISCONTINUED | OUTPATIENT
Start: 2022-07-30 | End: 2022-08-01 | Stop reason: HOSPADM

## 2022-07-30 RX ORDER — MULTIPLE VITAMINS W/ MINERALS TAB 9MG-400MCG
1 TAB ORAL DAILY
COMMUNITY

## 2022-07-30 RX ORDER — HYDROCODONE BITARTRATE AND ACETAMINOPHEN 7.5; 325 MG/1; MG/1
1 TABLET ORAL EVERY 4 HOURS PRN
Status: DISCONTINUED | OUTPATIENT
Start: 2022-07-30 | End: 2022-08-01 | Stop reason: HOSPADM

## 2022-07-30 RX ORDER — ACETAMINOPHEN 500 MG
1000 TABLET ORAL ONCE
Status: COMPLETED | OUTPATIENT
Start: 2022-07-30 | End: 2022-07-30

## 2022-07-30 RX ORDER — BUDESONIDE AND FORMOTEROL FUMARATE DIHYDRATE 160; 4.5 UG/1; UG/1
2 AEROSOL RESPIRATORY (INHALATION)
Status: DISCONTINUED | OUTPATIENT
Start: 2022-07-30 | End: 2022-08-01 | Stop reason: HOSPADM

## 2022-07-30 RX ORDER — FLUTICASONE PROPIONATE 50 MCG
2 SPRAY, SUSPENSION (ML) NASAL DAILY
Status: DISCONTINUED | OUTPATIENT
Start: 2022-07-31 | End: 2022-08-01 | Stop reason: HOSPADM

## 2022-07-30 RX ADMIN — AMLODIPINE BESYLATE 5 MG: 5 TABLET ORAL at 21:54

## 2022-07-30 RX ADMIN — SODIUM CHLORIDE 1 G: 900 INJECTION INTRAVENOUS at 22:01

## 2022-07-30 RX ADMIN — ASPIRIN 81 MG CHEWABLE TABLET 324 MG: 81 TABLET CHEWABLE at 17:09

## 2022-07-30 RX ADMIN — TAZOBACTAM SODIUM AND PIPERACILLIN SODIUM 3.38 G: 375; 3 INJECTION, SOLUTION INTRAVENOUS at 17:52

## 2022-07-30 RX ADMIN — GUAIFENESIN 600 MG: 600 TABLET, EXTENDED RELEASE ORAL at 21:54

## 2022-07-30 RX ADMIN — ACETAMINOPHEN 1000 MG: 500 TABLET ORAL at 17:09

## 2022-07-30 RX ADMIN — VANCOMYCIN HYDROCHLORIDE 2500 MG: 10 INJECTION, POWDER, LYOPHILIZED, FOR SOLUTION INTRAVENOUS at 18:49

## 2022-07-30 RX ADMIN — HEPARIN SODIUM 5000 UNITS: 5000 INJECTION, SOLUTION INTRAVENOUS; SUBCUTANEOUS at 21:54

## 2022-07-30 RX ADMIN — Medication 10 ML: at 21:55

## 2022-07-30 RX ADMIN — IOPAMIDOL 85 ML: 755 INJECTION, SOLUTION INTRAVENOUS at 17:28

## 2022-07-30 RX ADMIN — SODIUM CHLORIDE 500 ML: 9 INJECTION, SOLUTION INTRAVENOUS at 17:10

## 2022-07-30 RX ADMIN — SODIUM CHLORIDE 100 ML/HR: 9 INJECTION, SOLUTION INTRAVENOUS at 22:01

## 2022-07-31 LAB
ALBUMIN SERPL-MCNC: 3.5 G/DL (ref 3.5–5.2)
ALBUMIN/GLOB SERPL: 0.9 G/DL
ALP SERPL-CCNC: 114 U/L (ref 39–117)
ALT SERPL W P-5'-P-CCNC: 18 U/L (ref 1–41)
ANION GAP SERPL CALCULATED.3IONS-SCNC: 10 MMOL/L (ref 5–15)
AST SERPL-CCNC: 11 U/L (ref 1–40)
BASOPHILS # BLD AUTO: 0.06 10*3/MM3 (ref 0–0.2)
BASOPHILS NFR BLD AUTO: 0.3 % (ref 0–1.5)
BILIRUB SERPL-MCNC: 0.8 MG/DL (ref 0–1.2)
BUN SERPL-MCNC: 12 MG/DL (ref 6–20)
BUN/CREAT SERPL: 10.6 (ref 7–25)
CALCIUM SPEC-SCNC: 8.6 MG/DL (ref 8.6–10.5)
CHLORIDE SERPL-SCNC: 102 MMOL/L (ref 98–107)
CO2 SERPL-SCNC: 24 MMOL/L (ref 22–29)
CREAT SERPL-MCNC: 1.13 MG/DL (ref 0.76–1.27)
DEPRECATED RDW RBC AUTO: 43.8 FL (ref 37–54)
EGFRCR SERPLBLD CKD-EPI 2021: 79.7 ML/MIN/1.73
EOSINOPHIL # BLD AUTO: 0.02 10*3/MM3 (ref 0–0.4)
EOSINOPHIL NFR BLD AUTO: 0.1 % (ref 0.3–6.2)
ERYTHROCYTE [DISTWIDTH] IN BLOOD BY AUTOMATED COUNT: 13.2 % (ref 12.3–15.4)
GLOBULIN UR ELPH-MCNC: 3.8 GM/DL
GLUCOSE SERPL-MCNC: 123 MG/DL (ref 65–99)
HCT VFR BLD AUTO: 41.3 % (ref 37.5–51)
HGB BLD-MCNC: 13.9 G/DL (ref 13–17.7)
IMM GRANULOCYTES # BLD AUTO: 0.18 10*3/MM3 (ref 0–0.05)
IMM GRANULOCYTES NFR BLD AUTO: 1 % (ref 0–0.5)
L PNEUMO1 AG UR QL IA: NEGATIVE
LYMPHOCYTES # BLD AUTO: 1.98 10*3/MM3 (ref 0.7–3.1)
LYMPHOCYTES NFR BLD AUTO: 11 % (ref 19.6–45.3)
MAGNESIUM SERPL-MCNC: 2.1 MG/DL (ref 1.6–2.6)
MCH RBC QN AUTO: 30.1 PG (ref 26.6–33)
MCHC RBC AUTO-ENTMCNC: 33.7 G/DL (ref 31.5–35.7)
MCV RBC AUTO: 89.4 FL (ref 79–97)
MONOCYTES # BLD AUTO: 2.13 10*3/MM3 (ref 0.1–0.9)
MONOCYTES NFR BLD AUTO: 11.9 % (ref 5–12)
NEUTROPHILS NFR BLD AUTO: 13.6 10*3/MM3 (ref 1.7–7)
NEUTROPHILS NFR BLD AUTO: 75.7 % (ref 42.7–76)
NRBC BLD AUTO-RTO: 0 /100 WBC (ref 0–0.2)
PLATELET # BLD AUTO: 188 10*3/MM3 (ref 140–450)
PMV BLD AUTO: 9.7 FL (ref 6–12)
POTASSIUM SERPL-SCNC: 4.3 MMOL/L (ref 3.5–5.2)
PROT SERPL-MCNC: 7.3 G/DL (ref 6–8.5)
RBC # BLD AUTO: 4.62 10*6/MM3 (ref 4.14–5.8)
S PNEUM AG SPEC QL LA: NEGATIVE
SODIUM SERPL-SCNC: 136 MMOL/L (ref 136–145)
WBC NRBC COR # BLD: 17.97 10*3/MM3 (ref 3.4–10.8)

## 2022-07-31 PROCEDURE — 94664 DEMO&/EVAL PT USE INHALER: CPT

## 2022-07-31 PROCEDURE — 94799 UNLISTED PULMONARY SVC/PX: CPT

## 2022-07-31 PROCEDURE — 94761 N-INVAS EAR/PLS OXIMETRY MLT: CPT

## 2022-07-31 PROCEDURE — 96361 HYDRATE IV INFUSION ADD-ON: CPT

## 2022-07-31 PROCEDURE — 87899 AGENT NOS ASSAY W/OPTIC: CPT | Performed by: INTERNAL MEDICINE

## 2022-07-31 PROCEDURE — 25010000002 HEPARIN (PORCINE) PER 1000 UNITS: Performed by: NURSE PRACTITIONER

## 2022-07-31 PROCEDURE — 83735 ASSAY OF MAGNESIUM: CPT | Performed by: INTERNAL MEDICINE

## 2022-07-31 PROCEDURE — 94640 AIRWAY INHALATION TREATMENT: CPT

## 2022-07-31 PROCEDURE — 99233 SBSQ HOSP IP/OBS HIGH 50: CPT | Performed by: INTERNAL MEDICINE

## 2022-07-31 PROCEDURE — 87449 NOS EACH ORGANISM AG IA: CPT | Performed by: INTERNAL MEDICINE

## 2022-07-31 PROCEDURE — 96372 THER/PROPH/DIAG INJ SC/IM: CPT

## 2022-07-31 PROCEDURE — 80053 COMPREHEN METABOLIC PANEL: CPT | Performed by: NURSE PRACTITIONER

## 2022-07-31 PROCEDURE — 25010000002 CEFTRIAXONE PER 250 MG: Performed by: INTERNAL MEDICINE

## 2022-07-31 PROCEDURE — 85025 COMPLETE CBC W/AUTO DIFF WBC: CPT | Performed by: NURSE PRACTITIONER

## 2022-07-31 RX ORDER — MAGNESIUM SULFATE 1 G/100ML
1 INJECTION INTRAVENOUS AS NEEDED
Status: DISCONTINUED | OUTPATIENT
Start: 2022-07-31 | End: 2022-08-01 | Stop reason: HOSPADM

## 2022-07-31 RX ORDER — MAGNESIUM SULFATE HEPTAHYDRATE 40 MG/ML
2 INJECTION, SOLUTION INTRAVENOUS AS NEEDED
Status: DISCONTINUED | OUTPATIENT
Start: 2022-07-31 | End: 2022-08-01 | Stop reason: HOSPADM

## 2022-07-31 RX ORDER — MAGNESIUM SULFATE HEPTAHYDRATE 40 MG/ML
4 INJECTION, SOLUTION INTRAVENOUS AS NEEDED
Status: DISCONTINUED | OUTPATIENT
Start: 2022-07-31 | End: 2022-08-01 | Stop reason: HOSPADM

## 2022-07-31 RX ADMIN — DOXYCYCLINE 100 MG: 100 INJECTION, POWDER, LYOPHILIZED, FOR SOLUTION INTRAVENOUS at 09:21

## 2022-07-31 RX ADMIN — DULOXETINE 30 MG: 30 CAPSULE, DELAYED RELEASE ORAL at 09:20

## 2022-07-31 RX ADMIN — HEPARIN SODIUM 5000 UNITS: 5000 INJECTION, SOLUTION INTRAVENOUS; SUBCUTANEOUS at 20:28

## 2022-07-31 RX ADMIN — ACETAMINOPHEN 650 MG: 325 TABLET ORAL at 19:12

## 2022-07-31 RX ADMIN — Medication 10 ML: at 09:21

## 2022-07-31 RX ADMIN — BUDESONIDE AND FORMOTEROL FUMARATE DIHYDRATE 2 PUFF: 160; 4.5 AEROSOL RESPIRATORY (INHALATION) at 19:30

## 2022-07-31 RX ADMIN — Medication 10 ML: at 20:29

## 2022-07-31 RX ADMIN — SODIUM CHLORIDE 1 G: 900 INJECTION INTRAVENOUS at 22:16

## 2022-07-31 RX ADMIN — HEPARIN SODIUM 5000 UNITS: 5000 INJECTION, SOLUTION INTRAVENOUS; SUBCUTANEOUS at 05:12

## 2022-07-31 RX ADMIN — ACETAMINOPHEN 650 MG: 325 TABLET ORAL at 05:12

## 2022-07-31 RX ADMIN — SODIUM CHLORIDE 100 ML/HR: 9 INJECTION, SOLUTION INTRAVENOUS at 09:21

## 2022-07-31 RX ADMIN — BUDESONIDE AND FORMOTEROL FUMARATE DIHYDRATE 2 PUFF: 160; 4.5 AEROSOL RESPIRATORY (INHALATION) at 08:48

## 2022-07-31 RX ADMIN — HEPARIN SODIUM 5000 UNITS: 5000 INJECTION, SOLUTION INTRAVENOUS; SUBCUTANEOUS at 14:18

## 2022-07-31 RX ADMIN — GUAIFENESIN 600 MG: 600 TABLET, EXTENDED RELEASE ORAL at 20:28

## 2022-07-31 RX ADMIN — DOXYCYCLINE 100 MG: 100 INJECTION, POWDER, LYOPHILIZED, FOR SOLUTION INTRAVENOUS at 20:29

## 2022-07-31 RX ADMIN — BUDESONIDE AND FORMOTEROL FUMARATE DIHYDRATE 2 PUFF: 160; 4.5 AEROSOL RESPIRATORY (INHALATION) at 00:24

## 2022-07-31 RX ADMIN — GUAIFENESIN 600 MG: 600 TABLET, EXTENDED RELEASE ORAL at 09:20

## 2022-07-31 RX ADMIN — MONTELUKAST 10 MG: 10 TABLET, FILM COATED ORAL at 09:20

## 2022-07-31 RX ADMIN — SENNOSIDES AND DOCUSATE SODIUM 2 TABLET: 50; 8.6 TABLET ORAL at 09:21

## 2022-07-31 RX ADMIN — SODIUM CHLORIDE 100 ML/HR: 9 INJECTION, SOLUTION INTRAVENOUS at 05:09

## 2022-08-01 ENCOUNTER — READMISSION MANAGEMENT (OUTPATIENT)
Dept: CALL CENTER | Facility: HOSPITAL | Age: 47
End: 2022-08-01

## 2022-08-01 VITALS
OXYGEN SATURATION: 95 % | SYSTOLIC BLOOD PRESSURE: 143 MMHG | DIASTOLIC BLOOD PRESSURE: 94 MMHG | HEIGHT: 72 IN | BODY MASS INDEX: 39.01 KG/M2 | TEMPERATURE: 98.4 F | RESPIRATION RATE: 17 BRPM | HEART RATE: 88 BPM | WEIGHT: 288 LBS

## 2022-08-01 LAB
ANION GAP SERPL CALCULATED.3IONS-SCNC: 10 MMOL/L (ref 5–15)
BUN SERPL-MCNC: 8 MG/DL (ref 6–20)
BUN/CREAT SERPL: 9.3 (ref 7–25)
CALCIUM SPEC-SCNC: 8.5 MG/DL (ref 8.6–10.5)
CHLORIDE SERPL-SCNC: 105 MMOL/L (ref 98–107)
CO2 SERPL-SCNC: 22 MMOL/L (ref 22–29)
CREAT SERPL-MCNC: 0.86 MG/DL (ref 0.76–1.27)
DEPRECATED RDW RBC AUTO: 42.5 FL (ref 37–54)
EGFRCR SERPLBLD CKD-EPI 2021: 106.1 ML/MIN/1.73
ERYTHROCYTE [DISTWIDTH] IN BLOOD BY AUTOMATED COUNT: 13.1 % (ref 12.3–15.4)
GLUCOSE SERPL-MCNC: 117 MG/DL (ref 65–99)
HCT VFR BLD AUTO: 40.7 % (ref 37.5–51)
HGB BLD-MCNC: 13.8 G/DL (ref 13–17.7)
MAGNESIUM SERPL-MCNC: 2 MG/DL (ref 1.6–2.6)
MCH RBC QN AUTO: 29.9 PG (ref 26.6–33)
MCHC RBC AUTO-ENTMCNC: 33.9 G/DL (ref 31.5–35.7)
MCV RBC AUTO: 88.3 FL (ref 79–97)
PLATELET # BLD AUTO: 189 10*3/MM3 (ref 140–450)
PMV BLD AUTO: 10 FL (ref 6–12)
POTASSIUM SERPL-SCNC: 3.9 MMOL/L (ref 3.5–5.2)
RBC # BLD AUTO: 4.61 10*6/MM3 (ref 4.14–5.8)
SODIUM SERPL-SCNC: 137 MMOL/L (ref 136–145)
WBC NRBC COR # BLD: 12.7 10*3/MM3 (ref 3.4–10.8)

## 2022-08-01 PROCEDURE — 25010000002 HEPARIN (PORCINE) PER 1000 UNITS: Performed by: NURSE PRACTITIONER

## 2022-08-01 PROCEDURE — 85027 COMPLETE CBC AUTOMATED: CPT | Performed by: INTERNAL MEDICINE

## 2022-08-01 PROCEDURE — 94664 DEMO&/EVAL PT USE INHALER: CPT

## 2022-08-01 PROCEDURE — 80048 BASIC METABOLIC PNL TOTAL CA: CPT | Performed by: INTERNAL MEDICINE

## 2022-08-01 PROCEDURE — 94799 UNLISTED PULMONARY SVC/PX: CPT

## 2022-08-01 PROCEDURE — 94761 N-INVAS EAR/PLS OXIMETRY MLT: CPT

## 2022-08-01 PROCEDURE — 99238 HOSP IP/OBS DSCHRG MGMT 30/<: CPT | Performed by: INTERNAL MEDICINE

## 2022-08-01 PROCEDURE — 96372 THER/PROPH/DIAG INJ SC/IM: CPT

## 2022-08-01 PROCEDURE — 83735 ASSAY OF MAGNESIUM: CPT | Performed by: INTERNAL MEDICINE

## 2022-08-01 PROCEDURE — 25010000002 CEFTRIAXONE PER 250 MG: Performed by: INTERNAL MEDICINE

## 2022-08-01 RX ORDER — CEFDINIR 300 MG/1
300 CAPSULE ORAL EVERY 12 HOURS SCHEDULED
Qty: 8 CAPSULE | Refills: 0 | Status: SHIPPED | OUTPATIENT
Start: 2022-08-02 | End: 2022-08-06

## 2022-08-01 RX ORDER — DOXYCYCLINE 100 MG/1
100 CAPSULE ORAL EVERY 12 HOURS SCHEDULED
Qty: 7 CAPSULE | Refills: 0 | Status: SHIPPED | OUTPATIENT
Start: 2022-08-01 | End: 2022-08-05

## 2022-08-01 RX ORDER — DOXYCYCLINE 100 MG/1
100 CAPSULE ORAL EVERY 12 HOURS SCHEDULED
Status: DISCONTINUED | OUTPATIENT
Start: 2022-08-01 | End: 2022-08-01 | Stop reason: HOSPADM

## 2022-08-01 RX ORDER — CEFDINIR 300 MG/1
300 CAPSULE ORAL EVERY 12 HOURS SCHEDULED
Status: DISCONTINUED | OUTPATIENT
Start: 2022-08-02 | End: 2022-08-01 | Stop reason: HOSPADM

## 2022-08-01 RX ORDER — CEFTRIAXONE 1 G/1
1 INJECTION, POWDER, FOR SOLUTION INTRAMUSCULAR; INTRAVENOUS ONCE
Status: COMPLETED | OUTPATIENT
Start: 2022-08-01 | End: 2022-08-01

## 2022-08-01 RX ADMIN — DOXYCYCLINE 100 MG: 100 CAPSULE ORAL at 08:01

## 2022-08-01 RX ADMIN — MONTELUKAST 10 MG: 10 TABLET, FILM COATED ORAL at 08:01

## 2022-08-01 RX ADMIN — HEPARIN SODIUM 5000 UNITS: 5000 INJECTION, SOLUTION INTRAVENOUS; SUBCUTANEOUS at 05:13

## 2022-08-01 RX ADMIN — CEFTRIAXONE SODIUM 1 G: 1 INJECTION, POWDER, FOR SOLUTION INTRAMUSCULAR; INTRAVENOUS at 10:40

## 2022-08-01 RX ADMIN — BUDESONIDE AND FORMOTEROL FUMARATE DIHYDRATE 2 PUFF: 160; 4.5 AEROSOL RESPIRATORY (INHALATION) at 07:26

## 2022-08-01 RX ADMIN — GUAIFENESIN 600 MG: 600 TABLET, EXTENDED RELEASE ORAL at 08:01

## 2022-08-01 RX ADMIN — ACETAMINOPHEN 650 MG: 325 TABLET ORAL at 03:26

## 2022-08-01 RX ADMIN — Medication 10 ML: at 08:01

## 2022-08-01 RX ADMIN — SENNOSIDES AND DOCUSATE SODIUM 2 TABLET: 50; 8.6 TABLET ORAL at 08:01

## 2022-08-01 RX ADMIN — AMLODIPINE BESYLATE 5 MG: 5 TABLET ORAL at 08:01

## 2022-08-01 RX ADMIN — DULOXETINE 30 MG: 30 CAPSULE, DELAYED RELEASE ORAL at 08:01

## 2022-08-01 RX ADMIN — SODIUM CHLORIDE 1 G: 900 INJECTION INTRAVENOUS at 08:00

## 2022-08-01 NOTE — OUTREACH NOTE
Prep Survey    Flowsheet Row Responses   [facility] patient discharged from? [place]   Is LACE score < 7 ? No   Emergency Room discharge w/ pulse ox? No   Eligibility [facility]   Date of Admission 07/30/22   Date of Discharge 08/01/22   Discharge Disposition Home or Self Care   Discharge diagnosis Community acquired pneumonia- Sepsis   Does the patient have one of the following disease processes/diagnoses(primary or secondary)? Sepsis   Does the patient have Home health ordered? No   Is there a DME ordered? No   Prep survey completed? Yes          SEBASTIAN SEWELL - Registered Nurse

## 2022-08-02 ENCOUNTER — TRANSITIONAL CARE MANAGEMENT TELEPHONE ENCOUNTER (OUTPATIENT)
Dept: CALL CENTER | Facility: HOSPITAL | Age: 47
End: 2022-08-02

## 2022-08-02 NOTE — OUTREACH NOTE
Call Center TCM Note    Flowsheet Row Responses   [facility] facility patient discharged from? [place]   Does the patient have one of the following disease processes/diagnoses(primary or secondary)? Sepsis   TCM attempt successful? No  [verbal release for Georgina Fortino, sister]   Unsuccessful attempts Attempt 1   Comments regarding PCP Hospital PCP FOLLOW UP APPOINTMENT IS 8/8/22@1015am          Georgina Fowler RN    8/2/2022, 09:19 EDT

## 2022-08-02 NOTE — OUTREACH NOTE
Call Center TCM Note    Flowsheet Row Responses   [facility] patient discharged from? [place]   Does the patient have one of the following disease processes/diagnoses(primary or secondary)? Sepsis   TCM attempt successful? No   Unsuccessful attempts Attempt 2   Comments regarding PCP Hospital PCP FOLLOW UP APPOINTMENT IS 8/8/22@1015am          Georgina Fowler RN    8/2/2022, 13:39 EDT

## 2022-08-03 ENCOUNTER — TRANSITIONAL CARE MANAGEMENT TELEPHONE ENCOUNTER (OUTPATIENT)
Dept: CALL CENTER | Facility: HOSPITAL | Age: 47
End: 2022-08-03

## 2022-08-03 NOTE — OUTREACH NOTE
Call Center TCM Note    Flowsheet Row Responses   [facility] patient discharged from? [place]   Does the patient have one of the following disease processes/diagnoses(primary or secondary)? Sepsis   TCM attempt successful? No   Unsuccessful attempts Attempt 3          Kaylee Cabral LPN    8/3/2022, 09:22 EDT

## 2022-08-04 LAB
BACTERIA SPEC AEROBE CULT: NORMAL
BACTERIA SPEC AEROBE CULT: NORMAL

## 2022-08-08 ENCOUNTER — OFFICE VISIT (OUTPATIENT)
Dept: INTERNAL MEDICINE | Facility: CLINIC | Age: 47
End: 2022-08-08

## 2022-08-08 ENCOUNTER — HOSPITAL ENCOUNTER (OUTPATIENT)
Dept: GENERAL RADIOLOGY | Facility: HOSPITAL | Age: 47
Discharge: HOME | End: 2022-08-08
Admitting: INTERNAL MEDICINE

## 2022-08-08 VITALS
SYSTOLIC BLOOD PRESSURE: 136 MMHG | RESPIRATION RATE: 18 BRPM | DIASTOLIC BLOOD PRESSURE: 72 MMHG | TEMPERATURE: 97.7 F | HEART RATE: 80 BPM | WEIGHT: 284 LBS | OXYGEN SATURATION: 98 % | BODY MASS INDEX: 38.52 KG/M2

## 2022-08-08 DIAGNOSIS — J45.20 MILD INTERMITTENT ASTHMA WITHOUT COMPLICATION: ICD-10-CM

## 2022-08-08 DIAGNOSIS — J18.9 PNEUMONIA OF LEFT LOWER LOBE DUE TO INFECTIOUS ORGANISM: ICD-10-CM

## 2022-08-08 DIAGNOSIS — J18.9 PNEUMONIA OF LEFT LOWER LOBE DUE TO INFECTIOUS ORGANISM: Primary | ICD-10-CM

## 2022-08-08 PROCEDURE — 71046 X-RAY EXAM CHEST 2 VIEWS: CPT

## 2022-08-08 PROCEDURE — 99495 TRANSJ CARE MGMT MOD F2F 14D: CPT | Performed by: INTERNAL MEDICINE

## 2022-08-08 RX ORDER — ALBUTEROL SULFATE 90 UG/1
2 AEROSOL, METERED RESPIRATORY (INHALATION) EVERY 4 HOURS PRN
Qty: 18 G | Refills: 4 | Status: SHIPPED | OUTPATIENT
Start: 2022-08-08

## 2022-08-08 RX ORDER — METHYLPREDNISOLONE 4 MG/1
TABLET ORAL
Qty: 21 TABLET | Refills: 0 | Status: SHIPPED | OUTPATIENT
Start: 2022-08-08 | End: 2022-08-29

## 2022-08-09 ENCOUNTER — TELEPHONE (OUTPATIENT)
Dept: INTERNAL MEDICINE | Facility: CLINIC | Age: 47
End: 2022-08-09

## 2022-08-09 DIAGNOSIS — J18.9 PERSISTENT PNEUMONIA: ICD-10-CM

## 2022-08-09 DIAGNOSIS — R11.0 NAUSEA: Primary | ICD-10-CM

## 2022-08-09 NOTE — PROGRESS NOTES
Chief Complaint   Patient presents with   • Hospital Follow Up Visit     Sepsis, Pneumonia   [facility]  Date of Admission 07/30/22  Date of Discharge 08/01/22       History of Present Illness    The patient presents to the office for a follow-up visit from a recent hospitalization. The patient was hospitalized from 30-Jul-2022 through 01-Aug-2022 with pneumonia and sepsis. The records have been received and reviewed. The medication list has been reconciled. The hospital stay was uncomplicated. His in hospital treatment consisted of intravenous antibiotics and oral antibiotics. Hospital Course:   Pj Zuleta is a 47 y.o. male w/ hx htn, asthma presented to 3 days fever, sore throat, nausea, and 1 day left sided chest pain w/ coughing. In ED febrile, leukocytosis 19,000. D-dimer mildly elevated so ct angio obtained revealing no PE, did have quite dense consolidation LLL. Covid/flu pcr negative. Initiated on iv fluilds, iv antibiotics and admitted to hospitalist service. Patient responded will to antibiotics, urine antigens were negative, blood cultures negative thus far, temp curve normalized. Symptomatically improved, cough improved and minimal chest wall pain. Feels ready to go home. On room air. D/c home on cefdinir and doxy x 4 more days (7 days total abx), f/u pcp 1 week. F/u pcp regarding a1c of 6.0        Since leaving the hospital he reports that he is improved. He reports a dry cough, wheezing and fatigue but denies abdominal pain, itchy watery eyes, bone pain, chills, congestion, a wet cough, decreased appetite, diarrhea, dysuria, ear drainage, ear pain, eye drainage, facial pain, fever, a headache, joint pain, myalgias, nasal discharge, nausea, neck stiffness, night sweats, a rash, sneezing, a sore throat, vomiting or wheezing. He also reports that he does not have chest pain, dyspnea, edema, syncope, blurred vision or palpitations.    The patient presents for a  follow-up related to pneumonia. He states that he completed his antibiotics. He reports a persistent cough. The cough is not productive. The patient denies a fever.    Medications      Current Outpatient Medications:   •  acetaminophen (TYLENOL) 325 MG tablet, Take 325 mg by mouth Every 6 (Six) Hours As Needed for Mild Pain ., Disp: , Rfl:   •  albuterol sulfate  (90 Base) MCG/ACT inhaler, Inhale 2 puffs Every 4 (Four) Hours As Needed for Wheezing., Disp: 18 g, Rfl: 4  •  amLODIPine (NORVASC) 5 MG tablet, TAKE 1 TABLET BY MOUTH DAILY, Disp: 30 tablet, Rfl: 3  •  DULoxetine (Cymbalta) 30 MG capsule, Take 1 capsule by mouth Daily., Disp: 90 capsule, Rfl: 2  •  fluticasone (FLONASE) 50 MCG/ACT nasal spray, 2 sprays into the nostril(s) as directed by provider Daily., Disp: 15.8 mL, Rfl: 4  •  montelukast (SINGULAIR) 10 MG tablet, Take 1 tablet by mouth Daily., Disp: 30 tablet, Rfl: 6  •  multivitamin with minerals tablet tablet, Take 1 tablet by mouth Daily., Disp: , Rfl:   •  vitamin B-12 (CYANOCOBALAMIN) 500 MCG tablet, Take 500 mcg by mouth Daily., Disp: , Rfl:   •  albuterol (ACCUNEB) 1.25 MG/3ML nebulizer solution, Take 3 mL by nebulization Every 6 (Six) Hours As Needed for Wheezing., Disp: 30 each, Rfl: 1  •  budesonide-formoterol (Symbicort) 160-4.5 MCG/ACT inhaler, Inhale 2 puffs 2 (Two) Times a Day., Disp: 10.2 g, Rfl: 4   Current outpatient and discharge medications have been reconciled for the patient.  Reviewed by: Kameron Jolley MD    Allergies    Allergies   Allergen Reactions   • Shellfish-Derived Products Unknown - Low Severity       Problem List    Patient Active Problem List   Diagnosis   • Airway hyperreactivity   • Allergic rhinitis   • Anxiety   • Blues   • Cannot sleep   • Fungal infection of toenail   • Loud breathing during sleep   • Obstructive apnea   • Periodic limb movement   • Sepsis (HCC)   • Community acquired pneumonia of left lung   • Mild intermittent asthma   • HTN  (hypertension)       Medications, Allergies, Problems List and Past History were reviewed and updated.    Physical Examination    /72 (BP Location: Other (Comment), Patient Position: Sitting, Cuff Size: Adult) Comment (BP Location): Right Wrist  Pulse 80   Temp 97.7 °F (36.5 °C) (Infrared)   Resp 18   Wt 129 kg (284 lb)   SpO2 98%   BMI 38.52 kg/m²     HEENT: Head- Normocephalic Atraumatic. Facies- Within normal limits. Pinnas- Normal texture and shape bilaterally. Canals- Normal bilaterally. TMs- Normal bilaterally. Nares- Patent bilaterally. Nasal Septum- is normal. There is no tenderness to palpation over the frontal or maxillary sinuses. Lids- Normal bilaterally. Conjunctiva- Clear bilaterally. Sclera- Anicteric bilaterally. Oropharynx- Moist with no lesions. Tonsils- No enlargement, erythema or exudate.    Neck: Thyroid- non enlarged, symmetric and has no nodules. No bruits are detected. ROM- Normal Range of Motion with no rigidity.    Lungs: Auscultation- Right lung clear; The left lung has diffuse inspiratory and expiratory wheezes. There are no retractions, clubbing or cyanosis. The Expiratory to Inspiratory ratio is equal.    Lymph Nodes: Cervical- no enlarged lymph nodes noted.    Cardiovascular: There are no carotid bruits. Heart- Normal Rate with Regular rhythm and no murmurs. There are no gallops. There are no rubs. In the lower extremities there is no edema. The upper extremities do not have edema.    Abdomen: Soft, benign, non-tender with no masses, hernias, organomegaly or scars.    Impression and Assessment    Pneumonia.    Plan    Pneumonia Plan: A repeat CXR will be obtained today. Further plans will be made following testing. His A1c was 6 and will need to be followed.  With persistent wheezing and asthma history will add a medrol dose pack.     Diagnoses and all orders for this visit:    1. Pneumonia of left lower lobe due to infectious organism (Primary)  -     methylPREDNISolone  (MEDROL) 4 MG dose pack; Take as directed on package instructions.  Dispense: 21 tablet; Refill: 0  -     XR Chest PA & Lateral; Future  -     albuterol sulfate  (90 Base) MCG/ACT inhaler; Inhale 2 puffs Every 4 (Four) Hours As Needed for Wheezing.  Dispense: 18 g; Refill: 4    2. Mild intermittent asthma without complication  -     albuterol sulfate  (90 Base) MCG/ACT inhaler; Inhale 2 puffs Every 4 (Four) Hours As Needed for Wheezing.  Dispense: 18 g; Refill: 4    Transitional Care Management Certification  I certify that the following are true:  1. Communication was made within 2 business days of discharge.  2. Complexity of Medical Decision Making is moderate.  3. Face to face visit occurred within 8 days.    *Note: 35164 is for high complexity patients with a face to face visit within 7 days of discharge.  67005 is for high complexity patients with a face to face on days 8-14 post discharge or medium complexity with face to face visit within 14 days post discharge.        Return to Office    The patient was instructed to return for follow-up in 2 weeks. The patient was instructed to return sooner if the condition changes, worsens, or does not resolve.

## 2022-08-09 NOTE — TELEPHONE ENCOUNTER
Informed pt regarding recent CXR. Pt was wondering if anything could be done regarding nausea that has been ongoing since treatment.

## 2022-08-10 ENCOUNTER — READMISSION MANAGEMENT (OUTPATIENT)
Dept: CALL CENTER | Facility: HOSPITAL | Age: 47
End: 2022-08-10

## 2022-08-10 NOTE — OUTREACH NOTE
Sepsis Week 2 Survey    Flowsheet Row Responses   [facility] patient discharged from? [facility]   Does the patient have one of the following disease processes/diagnoses(primary or secondary)? Sepsis   Week 2 attempt successful? Yes   Call start time 1243   Call end time 1245   Discharge diagnosis Community acquired pneumonia- Sepsis   Meds reviewed with patient/caregiver? Yes   Is the patient taking all medications as directed (includes completed medication regime)? Yes   Does the patient have a primary care provider?  Yes   Comments regarding PCP Hospital PCP FOLLOW UP APPOINTMENT IS 8/8/22@1015am   Has the patient kept scheduled appointments due by today? Yes   Has home health visited the patient within 72 hours of discharge? N/A   Psychosocial issues? No   Did the patient receive a copy of their discharge instructions? Yes   What is the patient's perception of their health status since discharge? Improving   Is the patient/caregiver able to teach back Sepsis? S - Sleepy, difficult to arouse,confused, S - Shivering,fever or very cold, S - Short of breath, E - Extreme pain or generalized discomfort (worst ever,especially abdomen)   Is the patient/caregiver able to teach back signs and symptoms of worsening condition: Fever, Shortness of breath/rapid respiratory rate, Altered mental status(confusion/coma)   Week 2 call completed? Yes   Wrap up additional comments Sister reports that Pt seems to be doing well. Still has smoe SOA at times. and did follow up with his DR> Call was dropped.           DEIRDRE SINGH - Registered Nurse

## 2022-08-11 RX ORDER — ONDANSETRON HYDROCHLORIDE 8 MG/1
8 TABLET, FILM COATED ORAL EVERY 8 HOURS PRN
Qty: 30 TABLET | Refills: 2 | Status: SHIPPED | OUTPATIENT
Start: 2022-08-11

## 2022-08-11 NOTE — TELEPHONE ENCOUNTER
Please tell patient that Zofran has been called in for nausea and that I would like to repeat his chest x-ray in 6 weeks

## 2022-08-22 ENCOUNTER — OFFICE VISIT (OUTPATIENT)
Dept: INTERNAL MEDICINE | Facility: CLINIC | Age: 47
End: 2022-08-22

## 2022-08-22 VITALS
WEIGHT: 280 LBS | DIASTOLIC BLOOD PRESSURE: 84 MMHG | SYSTOLIC BLOOD PRESSURE: 120 MMHG | RESPIRATION RATE: 20 BRPM | TEMPERATURE: 97.8 F | HEART RATE: 78 BPM | BODY MASS INDEX: 37.97 KG/M2

## 2022-08-22 DIAGNOSIS — I10 PRIMARY HYPERTENSION: ICD-10-CM

## 2022-08-22 DIAGNOSIS — J18.9 PERSISTENT PNEUMONIA: Primary | ICD-10-CM

## 2022-08-22 PROCEDURE — 99213 OFFICE O/P EST LOW 20 MIN: CPT | Performed by: INTERNAL MEDICINE

## 2022-08-22 RX ORDER — AMLODIPINE BESYLATE 10 MG/1
10 TABLET ORAL DAILY
Qty: 90 TABLET | Refills: 2 | Status: SHIPPED | OUTPATIENT
Start: 2022-08-22

## 2022-08-22 NOTE — PROGRESS NOTES
"Chief Complaint  Pneumonia (2 week follow up )    Subjective    Pj Zuleta is a 47 y.o. male.     Pj Zuleta presents to [facility] for       History of Present Illness       The patient presents today is pneumonia follow-up.    1. Pneumonia follow up - The patient states he was feeling sick about 4 weeks ago.  He describes not feeling too good, cold, chills, fever, no cough. He  states he thought he had food poisoning. He states he went over to [facility], they told him influenza test and Covid, negative, they said \"go on back home, you are fine, well you're not fine\", but they said they could not find nothing, that this is probably a bug and just need to work its way out, and that was on 07/29/2022, then on 07/30/2022 he states he awoke with cold chills, same thing. He states about 2:00 p.m. in the afternoon, he had a real deep chest pain, fever and body aches. He notes his sister said he might be having a stroke so he went to [facility] where tests and EKG were done. He states his results came back with lobar pneumonia in left lung. The patient reports he was admitted to [facility] from 07/30 to 08/01/2022.       The following portions of the patient's history were reviewed and updated as appropriate: allergies, current medications, past family history, past medical history, past social history, past surgical history and problem list.    Review of Systems    Objective   Vital Signs:   /84 (BP Location: Right arm)   Pulse 78   Temp 97.8 °F (36.6 °C) (Temporal)   Resp 20   Wt 127 kg (280 lb)   BMI 37.97 kg/m²     Body mass index is 37.97 kg/m².    Physical Exam  Constitutional:       Comments: Patient is alert x3, nondistressed   HENT:      Head: Normocephalic and atraumatic.      Mouth/Throat:      Mouth: Mucous membranes are moist.   Eyes:      Extraocular Movements: Extraocular movements intact. "      Pupils: Pupils are equal, round, and reactive to light.   Cardiovascular:      Heart sounds: No murmur heard.    No friction rub. No gallop.      Comments: S1, S2  Pulmonary:      Breath sounds: No wheezing or rhonchi.               Assessment and Plan  Diagnoses and all orders for this visit:    1. Pneumonia follow-up post hospital discharge   - Patient previous finding on chest x-ray did suggest of a persistent infiltrate post antibiotic treatment in the left lower lobe. Patient clinically is doing a lot better. No shortness of breath, no chest tightness, no fever, no chills, no nausea, no vomiting and so since he is clinically doing stable here today although he is here to follow up in clinic, I think it may be a little bit too early to do the chest x-ray repeat, so I am going to extend it out at least 3 to 4 weeks here which we would do a chest x-ray on 9/12/2022. Patient has agreed to that and that way we can make sure that the follow-up x-ray gives enough time for resolution for any inflammatory changes or infiltrates from previous x-ray.          Follow Up   No follow-ups on file.  Patient was given instructions and counseling regarding his condition or for health maintenance advice. Please see specific information pulled into the AVS if appropriate.          Transcribed from ambient dictation for Carl Chiang MD by KAMALJIT SEE.  08/22/22   14:06 EDT    Patient verbalized consent to the visit recording.  I have personally performed the services described in this document as transcribed by the above individual, and it is both accurate and complete.  Carl Chiang MD  8/29/2022  02:18 EDT

## 2022-09-06 ENCOUNTER — HOSPITAL ENCOUNTER (OUTPATIENT)
Dept: GENERAL RADIOLOGY | Facility: HOSPITAL | Age: 47
Discharge: HOME | End: 2022-09-06

## 2022-09-06 ENCOUNTER — OFFICE VISIT (OUTPATIENT)
Dept: INTERNAL MEDICINE | Facility: CLINIC | Age: 47
End: 2022-09-06

## 2022-09-06 ENCOUNTER — LAB (OUTPATIENT)
Dept: LAB | Facility: HOSPITAL | Age: 47
End: 2022-09-06

## 2022-09-06 VITALS
WEIGHT: 282 LBS | HEIGHT: 72 IN | HEART RATE: 88 BPM | DIASTOLIC BLOOD PRESSURE: 80 MMHG | SYSTOLIC BLOOD PRESSURE: 122 MMHG | RESPIRATION RATE: 21 BRPM | BODY MASS INDEX: 38.19 KG/M2

## 2022-09-06 DIAGNOSIS — Z11.59 NEED FOR HEPATITIS C SCREENING TEST: ICD-10-CM

## 2022-09-06 DIAGNOSIS — I10 PRIMARY HYPERTENSION: ICD-10-CM

## 2022-09-06 DIAGNOSIS — Z12.5 SCREENING PSA (PROSTATE SPECIFIC ANTIGEN): ICD-10-CM

## 2022-09-06 DIAGNOSIS — Z00.00 WELL ADULT EXAM: Primary | ICD-10-CM

## 2022-09-06 DIAGNOSIS — Z12.11 COLON CANCER SCREENING: ICD-10-CM

## 2022-09-06 DIAGNOSIS — J45.20 MILD INTERMITTENT ASTHMA WITHOUT COMPLICATION: ICD-10-CM

## 2022-09-06 DIAGNOSIS — J18.9 PERSISTENT PNEUMONIA: ICD-10-CM

## 2022-09-06 DIAGNOSIS — Z13.220 LIPID SCREENING: ICD-10-CM

## 2022-09-06 LAB
CHOLEST SERPL-MCNC: 165 MG/DL (ref 0–200)
HCV AB SER DONR QL: NORMAL
HDLC SERPL-MCNC: 38 MG/DL (ref 40–60)
LDLC SERPL CALC-MCNC: 100 MG/DL (ref 0–100)
LDLC/HDLC SERPL: 2.52 {RATIO}
PSA SERPL-MCNC: 1.05 NG/ML (ref 0–4)
T4 FREE SERPL-MCNC: 0.97 NG/DL (ref 0.93–1.7)
TRIGL SERPL-MCNC: 156 MG/DL (ref 0–150)
TSH SERPL DL<=0.05 MIU/L-ACNC: 2.07 UIU/ML (ref 0.27–4.2)
VLDLC SERPL-MCNC: 27 MG/DL (ref 5–40)

## 2022-09-06 PROCEDURE — 84439 ASSAY OF FREE THYROXINE: CPT | Performed by: INTERNAL MEDICINE

## 2022-09-06 PROCEDURE — G0103 PSA SCREENING: HCPCS | Performed by: INTERNAL MEDICINE

## 2022-09-06 PROCEDURE — 86803 HEPATITIS C AB TEST: CPT | Performed by: INTERNAL MEDICINE

## 2022-09-06 PROCEDURE — 36415 COLL VENOUS BLD VENIPUNCTURE: CPT | Performed by: INTERNAL MEDICINE

## 2022-09-06 PROCEDURE — 80061 LIPID PANEL: CPT | Performed by: INTERNAL MEDICINE

## 2022-09-06 PROCEDURE — 99396 PREV VISIT EST AGE 40-64: CPT | Performed by: INTERNAL MEDICINE

## 2022-09-06 PROCEDURE — 84443 ASSAY THYROID STIM HORMONE: CPT | Performed by: INTERNAL MEDICINE

## 2022-09-06 PROCEDURE — 71046 X-RAY EXAM CHEST 2 VIEWS: CPT

## 2022-09-06 NOTE — PROGRESS NOTES
"Chief Complaint  [facility] for adult physical examination    Subjective    Pj Zuleta is a 47 y.o. male.     Pj Zuleta presents to [facility] for a complete physical.      1. Persistent pneumonia. - The patient reports he had a follow-up chest x-ray this morning. He states he has been walking more and going to the gym. He reports he is still using his inhaler more than he was using prior to having pneumonia. He denies having any pain in his chest or lungs. He states he is sleeping through the night. He reports some days he gets short of breath when using the inhaler.    Diet : regular    Health maintenance: Up-to-date    Social history: No EtOH consumption, no IV drug use, no marijuana, no illicit drugs          History of Present Illness    The following portions of the patient's history were reviewed and updated as appropriate: allergies, current medications, past family history, past medical history, past social history, past surgical history and problem list.        Review of Systems    Objective   Vital Signs:   /80   Pulse 88   Resp 21   Ht 182.9 cm (72\")   Wt 128 kg (282 lb)   BMI 38.25 kg/m²     Body mass index is 38.25 kg/m².    Physical Exam  Constitutional:       General: He is not in acute distress.     Appearance: Normal appearance.   HENT:      Head: Normocephalic and atraumatic.      Mouth/Throat:      Mouth: Mucous membranes are moist.   Eyes:      Extraocular Movements: Extraocular movements intact.      Pupils: Pupils are equal, round, and reactive to light.   Neck:      Comments: No goiter  Cardiovascular:      Pulses: Normal pulses.      Heart sounds: Normal heart sounds. No murmur heard.    No friction rub. No gallop.   Pulmonary:      Effort: Pulmonary effort is normal.      Breath sounds: Normal breath sounds. No wheezing or rhonchi.   Abdominal:      General: Bowel sounds are normal.      Palpations: " Abdomen is soft. There is no mass.      Tenderness: There is no abdominal tenderness.   Genitourinary:     Comments: No testicular , no inguinal hernia.  Musculoskeletal:      Cervical back: Neck supple.      Comments: +5 out of 5 both upper and lower proximal distributions.   Lymphadenopathy:      Cervical: No cervical adenopathy.   Skin:     General: Skin is warm.      Capillary Refill: Capillary refill takes 2 to 3 seconds.   Neurological:      Mental Status: He is alert.      Cranial Nerves: No cranial nerve deficit.               Assessment and Plan  Diagnoses and all orders for this visit:      1. Hypertension.  - Patient's blood pressure has been well controlled.  - He is continued on current blood pressure medications.    2. Persistent pneumonia or follow-up from COVID-19 infection.  - Patient is making a slow recovery.  - Patient did have his chest x-ray done today for follow-up chest x-ray. We will review that here later this week with results.  - Clinically, patient is making slow improvement and so we will continue to monitor his progress.    3. Asthma.  - This has been affected by the pneumonia previously from COVID-19.  - He has noticed a slight increase in infrequency of his inhalers, so encouraged him to continue medical management and monitoring his usage of the inhalers and we will also continue to monitor his progress.    4. Anticipatory guidance.  - Discussed with him in regards to recommend ophthalmology screening and dental screening as well.  - Patient will have his colonoscopy for colon cancer screening.    5. Other labs.  - He did have previous labs with labs that we will do today pertaining to his cholesterol for cholesterol screening, PSA for prostate cancer screening as well as thyroid today. Otherwise, everything looks good here today.    I will see patient back at 6-month follow-up or earlier if indicated and also based on chest x-ray findings or any other clinical symptoms and post  recovery from COVID-19.      Follow Up   No follow-ups on file.  Patient was given instructions and counseling regarding his condition or for health maintenance advice. Please see specific information pulled into the AVS if appropriate.     Transcribed from ambient dictation for Carl Chiang MD by KACY WATERS.  09/06/22   15:42 EDT    Patient verbalized consent to the visit recording.  I have personally performed the services described in this document as transcribed by the above individual, and it is both accurate and complete.  Carl Chiang MD  9/18/2022  15:19 EDT

## 2022-09-09 VITALS
TEMPERATURE: 98 F | RESPIRATION RATE: 18 BRPM | BODY MASS INDEX: 37.93 KG/M2 | OXYGEN SATURATION: 95 % | WEIGHT: 280 LBS | SYSTOLIC BLOOD PRESSURE: 157 MMHG | DIASTOLIC BLOOD PRESSURE: 105 MMHG | HEIGHT: 72 IN | HEART RATE: 99 BPM

## 2022-09-09 PROCEDURE — 99283 EMERGENCY DEPT VISIT LOW MDM: CPT

## 2022-09-10 ENCOUNTER — APPOINTMENT (OUTPATIENT)
Dept: CT IMAGING | Facility: HOSPITAL | Age: 47
End: 2022-09-10

## 2022-09-10 ENCOUNTER — HOSPITAL ENCOUNTER (EMERGENCY)
Facility: HOSPITAL | Age: 47
Discharge: HOME | End: 2022-09-10
Attending: EMERGENCY MEDICINE | Admitting: EMERGENCY MEDICINE

## 2022-09-10 ENCOUNTER — APPOINTMENT (OUTPATIENT)
Dept: GENERAL RADIOLOGY | Facility: HOSPITAL | Age: 47
End: 2022-09-10
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
## 2022-09-10 DIAGNOSIS — M79.18 MUSCULOSKELETAL PAIN: ICD-10-CM

## 2022-09-10 DIAGNOSIS — R10.9 ACUTE RIGHT FLANK PAIN: ICD-10-CM

## 2022-09-10 PROCEDURE — 71046 X-RAY EXAM CHEST 2 VIEWS: CPT

## 2022-09-10 PROCEDURE — 74177 CT ABD & PELVIS W/CONTRAST: CPT

## 2022-09-10 PROCEDURE — 25010000002 IOPAMIDOL 61 % SOLUTION: Performed by: EMERGENCY MEDICINE

## 2022-09-10 RX ORDER — SODIUM CHLORIDE 0.9 % (FLUSH) 0.9 %
10 SYRINGE (ML) INJECTION AS NEEDED
Status: DISCONTINUED | OUTPATIENT
Start: 2022-09-10 | End: 2022-09-10 | Stop reason: HOSPADM

## 2022-09-10 RX ADMIN — IOPAMIDOL 100 ML: 612 INJECTION, SOLUTION INTRAVENOUS at 04:30
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
## 2022-09-14 ENCOUNTER — OFFICE VISIT (OUTPATIENT)
Dept: INTERNAL MEDICINE | Facility: CLINIC | Age: 47
End: 2022-09-14

## 2022-09-14 VITALS
HEART RATE: 76 BPM | DIASTOLIC BLOOD PRESSURE: 102 MMHG | BODY MASS INDEX: 38.45 KG/M2 | SYSTOLIC BLOOD PRESSURE: 150 MMHG | WEIGHT: 283.5 LBS | TEMPERATURE: 97.8 F | RESPIRATION RATE: 20 BRPM
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
## 2022-09-14 DIAGNOSIS — M54.50 CHRONIC BILATERAL LOW BACK PAIN WITHOUT SCIATICA: ICD-10-CM

## 2022-09-14 DIAGNOSIS — G89.29 CHRONIC BILATERAL LOW BACK PAIN WITHOUT SCIATICA: ICD-10-CM

## 2022-09-14 DIAGNOSIS — M25.551 RIGHT HIP PAIN: ICD-10-CM

## 2022-09-14 PROCEDURE — 99214 OFFICE O/P EST MOD 30 MIN: CPT | Performed by: INTERNAL MEDICINE

## 2022-09-14 RX ORDER — CYCLOBENZAPRINE HCL 10 MG
10 TABLET ORAL 3 TIMES DAILY PRN
Qty: 45 TABLET | Refills: 3 | Status: SHIPPED | OUTPATIENT
Start: 2022-09-14

## 2022-09-14 RX ORDER — IBUPROFEN 600 MG/1
600 TABLET ORAL EVERY 6 HOURS PRN
Qty: 60 TABLET | Refills: 2 | Status: SHIPPED | OUTPATIENT
Start: 2022-09-14
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
## 2022-09-28 ENCOUNTER — TREATMENT (OUTPATIENT)
Dept: PHYSICAL THERAPY | Facility: CLINIC | Age: 47
End: 2022-09-28

## 2022-09-28 DIAGNOSIS — M25.551 RIGHT HIP PAIN: Primary | ICD-10-CM

## 2022-09-28 DIAGNOSIS — M54.41 ACUTE BILATERAL LOW BACK PAIN WITH RIGHT-SIDED SCIATICA: ICD-10-CM

## 2022-09-28 PROCEDURE — 97162 PT EVAL MOD COMPLEX 30 MIN: CPT | Performed by: PHYSICAL THERAPIST

## 2022-09-28 PROCEDURE — 97014 ELECTRIC STIMULATION THERAPY: CPT | Performed by: PHYSICAL THERAPIST
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
## 2022-10-03 ENCOUNTER — TREATMENT (OUTPATIENT)
Dept: PHYSICAL THERAPY | Facility: CLINIC | Age: 47
End: 2022-10-03

## 2022-10-03 DIAGNOSIS — M54.41 ACUTE BILATERAL LOW BACK PAIN WITH RIGHT-SIDED SCIATICA: ICD-10-CM

## 2022-10-03 DIAGNOSIS — M25.551 RIGHT HIP PAIN: Primary | ICD-10-CM

## 2022-10-03 PROCEDURE — 97110 THERAPEUTIC EXERCISES: CPT | Performed by: PHYSICAL THERAPIST

## 2022-10-03 PROCEDURE — DRYNDLTRIAL DRY NEEDLING TRIAL: Performed by: PHYSICAL THERAPIST

## 2022-10-03 PROCEDURE — 97140 MANUAL THERAPY 1/> REGIONS: CPT | Performed by: PHYSICAL THERAPIST
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
## 2022-10-05 ENCOUNTER — TREATMENT (OUTPATIENT)
Dept: PHYSICAL THERAPY | Facility: CLINIC | Age: 47
End: 2022-10-05

## 2022-10-05 DIAGNOSIS — M25.559 PAIN, HIP: ICD-10-CM

## 2022-10-05 DIAGNOSIS — M54.41 ACUTE BILATERAL LOW BACK PAIN WITH RIGHT-SIDED SCIATICA: ICD-10-CM

## 2022-10-05 DIAGNOSIS — M25.551 RIGHT HIP PAIN: Primary | ICD-10-CM

## 2022-10-05 PROCEDURE — 97110 THERAPEUTIC EXERCISES: CPT | Performed by: PHYSICAL THERAPIST

## 2022-10-05 PROCEDURE — 97014 ELECTRIC STIMULATION THERAPY: CPT | Performed by: PHYSICAL THERAPIST
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
## 2022-10-10 ENCOUNTER — TREATMENT (OUTPATIENT)
Dept: PHYSICAL THERAPY | Facility: CLINIC | Age: 47
End: 2022-10-10

## 2022-10-10 DIAGNOSIS — M54.41 ACUTE BILATERAL LOW BACK PAIN WITH RIGHT-SIDED SCIATICA: ICD-10-CM

## 2022-10-10 DIAGNOSIS — M25.551 RIGHT HIP PAIN: Primary | ICD-10-CM

## 2022-10-10 PROCEDURE — 97110 THERAPEUTIC EXERCISES: CPT | Performed by: PHYSICAL THERAPIST

## 2022-10-10 PROCEDURE — 97012 MECHANICAL TRACTION THERAPY: CPT | Performed by: PHYSICAL THERAPIST
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
## 2022-10-12 ENCOUNTER — TREATMENT (OUTPATIENT)
Dept: PHYSICAL THERAPY | Facility: CLINIC | Age: 47
End: 2022-10-12

## 2022-10-12 DIAGNOSIS — M25.551 RIGHT HIP PAIN: Primary | ICD-10-CM

## 2022-10-12 DIAGNOSIS — M54.41 ACUTE BILATERAL LOW BACK PAIN WITH RIGHT-SIDED SCIATICA: ICD-10-CM

## 2022-10-12 PROCEDURE — 97110 THERAPEUTIC EXERCISES: CPT | Performed by: PHYSICAL THERAPIST

## 2022-10-12 PROCEDURE — 97140 MANUAL THERAPY 1/> REGIONS: CPT | Performed by: PHYSICAL THERAPIST

## 2022-10-12 NOTE — PROGRESS NOTES
Physical Therapy Daily Treatment Note      Patient: Pj Zuleta   : 1975  Referring practitioner: Carl Chiang MD  Date of Initial Visit: Type: THERAPY  Noted: 2022  Today's Date: 10/12/2022  Patient seen for 5 sessions       Visit Diagnoses:    ICD-10-CM ICD-9-CM   1. Right hip pain  M25.551 719.45   2. Acute bilateral low back pain with right-sided sciatica  M54.41 724.2     724.3     338.19       Subjective   Patient reports back pain is about the same, states he isn't sleeping well. States  his pain level is 5/10 upon arrival in seated position. Right leg continues to go numb.  States numbness is more constant down entire right leg. States he was sore after traction last visit.     Objective   See Exercise, Manual, and Modality Logs for complete treatment.       Assessment/Plan   Pt verbalized mild improvement of right lateral hip pain and low back pain with LAD of RLE from hip. He demonstrates improved LTR ROM. Taut right hamstring persists with neural tension, tolerated PT assisted 90/90 position with nerve glides today. Encouraged patient to continue hamstring stretching, nerve glides, LAD, and LTR at home. Pt reports he f/u with PCP and likely with have appt in the next 1-2 weeks to discuss options due to increased pain recently. Could consider repeat DN next visit, pending symptoms.    Timed:       Manual Therapy:    9    mins  65373;     Therapeutic Exercise:    25     mins  94226;     Neuromuscular Preston:    0    mins  67957;    Therapeutic Activity:     0     mins  05627;     Gait Trainin     mins  98899;     Ultrasound:     0     mins  41280;    Ionto                               0    mins   48339  Self Care                       0     mins   12861  Canalith Repos    0     mins 17955      Un-Timed:  Electrical Stimulation:    0     mins  92741 (MC );  Dry Needling     0     mins self-pay  Traction     0     mins 73797      Timed Treatment:   34   mins   Total  Treatment:     36   mins    Corinne E. Perkins, PT  [place] License: [id number]

## 2022-10-17 ENCOUNTER — TREATMENT (OUTPATIENT)
Dept: PHYSICAL THERAPY | Facility: CLINIC | Age: 47
End: 2022-10-17

## 2022-10-17 DIAGNOSIS — M54.41 ACUTE BILATERAL LOW BACK PAIN WITH RIGHT-SIDED SCIATICA: ICD-10-CM

## 2022-10-17 DIAGNOSIS — M25.551 RIGHT HIP PAIN: Primary | ICD-10-CM

## 2022-10-17 PROCEDURE — 97110 THERAPEUTIC EXERCISES: CPT | Performed by: PHYSICAL THERAPIST

## 2022-10-17 PROCEDURE — 97140 MANUAL THERAPY 1/> REGIONS: CPT | Performed by: PHYSICAL THERAPIST
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
## 2022-10-19 ENCOUNTER — TELEPHONE (OUTPATIENT)
Dept: PHYSICAL THERAPY | Facility: CLINIC | Age: 47
End: 2022-10-19

## 2022-10-20 ENCOUNTER — TREATMENT (OUTPATIENT)
Dept: PHYSICAL THERAPY | Facility: CLINIC | Age: 47
End: 2022-10-20

## 2022-10-20 DIAGNOSIS — M54.41 ACUTE BILATERAL LOW BACK PAIN WITH RIGHT-SIDED SCIATICA: ICD-10-CM

## 2022-10-20 DIAGNOSIS — M25.551 RIGHT HIP PAIN: Primary | ICD-10-CM

## 2022-10-20 PROCEDURE — 97110 THERAPEUTIC EXERCISES: CPT | Performed by: PHYSICAL THERAPIST

## 2022-10-20 PROCEDURE — 97140 MANUAL THERAPY 1/> REGIONS: CPT | Performed by: PHYSICAL THERAPIST

## 2022-10-20 NOTE — PROGRESS NOTES
Physical Therapy Daily Treatment Note      Patient: Pj Zuleta   : 1975  Referring practitioner: Carl Chiang MD  Date of Initial Visit: Type: THERAPY  Noted: 2022  Today's Date: 10/20/2022  Patient seen for 7 sessions       Visit Diagnoses:    ICD-10-CM ICD-9-CM   1. Right hip pain  M25.551 719.45   2. Acute bilateral low back pain with right-sided sciatica  M54.41 724.2     724.3     338.19       Subjective   Patient reports he is feeling a little better in his leg, less gastroc symptoms. States  his pain level is 4/10 upon arrival. States he has f/u with PCP tomorrow. Continues to have urinary urgency symptoms at night, getting up about 4x/night to use bathroom. He denies pain or other dysfunction of bowel/bladder issues.     Objective   See Exercise, Manual, and Modality Logs for complete treatment.       Assessment/Plan   Centralization of RLE symptoms persists with LAD of RLE from both knee and hip. He is compliant with current HEP, added diaphragmatic breathing to home program. Held on repeating mechanical traction or DN options until after PCP appointment. Pt has verbalized improvement in overall pain, continues to take Tylenol daily in the morning for assistance. Increased R hip and LBP noted with attempted MWM into abduction from hook lying position. Taut R hamstring persists with neural tension. F/u with patient after MD appt.       Timed:         Manual Therapy:    10     mins  71659;     Therapeutic Exercise:    28     mins  19430;     Neuromuscular Preston:    0    mins  52710;    Therapeutic Activity:     0     mins  07388;     Gait Trainin     mins  99810;     Ultrasound:     0     mins  13755;    Ionto                               0    mins   35587  Self Care                       0     mins   04349  Canalith Repos    0     mins 86920      Un-Timed:  Electrical Stimulation:    0     mins  86297 (MC );  Dry Needling     0     mins self-pay  Traction     0      mins 61746      Timed Treatment:   38   mins   Total Treatment:     38   mins    Corinne E. Perkins, PT  [place] License: [id number]

## 2022-10-21 ENCOUNTER — OFFICE VISIT (OUTPATIENT)
Dept: INTERNAL MEDICINE | Facility: CLINIC | Age: 47
End: 2022-10-21

## 2022-10-21 VITALS
DIASTOLIC BLOOD PRESSURE: 90 MMHG | BODY MASS INDEX: 39.06 KG/M2 | TEMPERATURE: 98 F | OXYGEN SATURATION: 97 % | RESPIRATION RATE: 20 BRPM | WEIGHT: 288 LBS | SYSTOLIC BLOOD PRESSURE: 140 MMHG | HEART RATE: 73 BPM

## 2022-10-21 DIAGNOSIS — M54.41 ACUTE RIGHT-SIDED LOW BACK PAIN WITH RIGHT-SIDED SCIATICA: ICD-10-CM
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
## 2022-10-21 DIAGNOSIS — M25.551 RIGHT HIP PAIN: ICD-10-CM

## 2022-10-21 PROCEDURE — 99213 OFFICE O/P EST LOW 20 MIN: CPT | Performed by: INTERNAL MEDICINE
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
## 2022-10-24 ENCOUNTER — TELEPHONE (OUTPATIENT)
Dept: PHYSICAL THERAPY | Facility: CLINIC | Age: 47
End: 2022-10-24

## 2022-10-31 ENCOUNTER — TREATMENT (OUTPATIENT)
Dept: PHYSICAL THERAPY | Facility: CLINIC | Age: 47
End: 2022-10-31

## 2022-10-31 DIAGNOSIS — M25.551 RIGHT HIP PAIN: Primary | ICD-10-CM

## 2022-10-31 DIAGNOSIS — M54.41 ACUTE BILATERAL LOW BACK PAIN WITH RIGHT-SIDED SCIATICA: ICD-10-CM

## 2022-10-31 PROCEDURE — 97110 THERAPEUTIC EXERCISES: CPT | Performed by: PHYSICAL THERAPIST

## 2022-10-31 PROCEDURE — 97530 THERAPEUTIC ACTIVITIES: CPT | Performed by: PHYSICAL THERAPIST
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
## 2022-11-02 ENCOUNTER — TREATMENT (OUTPATIENT)
Dept: PHYSICAL THERAPY | Facility: CLINIC | Age: 47
End: 2022-11-02

## 2022-11-02 DIAGNOSIS — M54.41 ACUTE BILATERAL LOW BACK PAIN WITH RIGHT-SIDED SCIATICA: ICD-10-CM

## 2022-11-02 DIAGNOSIS — M25.551 RIGHT HIP PAIN: Primary | ICD-10-CM

## 2022-11-02 PROCEDURE — 97140 MANUAL THERAPY 1/> REGIONS: CPT | Performed by: PHYSICAL THERAPIST

## 2022-11-02 PROCEDURE — 97110 THERAPEUTIC EXERCISES: CPT | Performed by: PHYSICAL THERAPIST

## 2022-11-02 NOTE — PROGRESS NOTES
Physical Therapy Daily Treatment Note    [address] [facility], [address]  [place] [address]    Patient: Pj Zuleta   : 1975  Referring practitioner: Carl Chiang MD  Date of Initial Visit: Type: THERAPY  Noted: 2022  Today's Date: 2022  Patient seen for 9 sessions       Visit Diagnoses:    ICD-10-CM ICD-9-CM   1. Right hip pain  M25.551 719.45   2. Acute bilateral low back pain with right-sided sciatica  M54.41 724.2     724.3     338.19       Subjective   Patient reports range of motion still feels better. States  his pain level is 5/10 upon arrival.      Objective     See Exercise, Manual, and Modality Logs for complete treatment.       Assessment/Plan   Patient continues to have increased frequency of RLE radciualr symptoms to his foot. Reports weakness of RLE with functional transfers, like getting in/out of truck. Pt able to perform full right hip ER position into ROBERTO stretch; however notes both groin and LBP. Centralization of RLE symptoms noted best with LAD of RLE from knee, not hip. Progress standing functional strengthening exercises as tolerated next visit.       Timed:         Manual Therapy:    10     mins  72885;     Therapeutic Exercise:    26     mins  79008;     Neuromuscular Preston:    0    mins  89812;    Therapeutic Activity:     0     mins  86572;     Gait Trainin     mins  52048;     Ultrasound:     0     mins  41119;    Ionto                               0    mins   30068  Self Care                       0     mins   94507  Canalith Repos    0     mins 79879      Un-Timed:  Electrical Stimulation:    0     mins  21603 ( );  Dry Needling     0     mins self-pay  Traction     0     mins 50319      Timed Treatment:   36   mins   Total Treatment:     36   mins    Corinne E. Perkins, PT  [place] License: [id number]

## 2022-11-07 ENCOUNTER — TREATMENT (OUTPATIENT)
Dept: PHYSICAL THERAPY | Facility: CLINIC | Age: 47
End: 2022-11-07

## 2022-11-07 DIAGNOSIS — M25.551 RIGHT HIP PAIN: Primary | ICD-10-CM

## 2022-11-07 DIAGNOSIS — M54.41 ACUTE BILATERAL LOW BACK PAIN WITH RIGHT-SIDED SCIATICA: ICD-10-CM

## 2022-11-07 PROCEDURE — 97140 MANUAL THERAPY 1/> REGIONS: CPT | Performed by: PHYSICAL THERAPIST

## 2022-11-07 PROCEDURE — 97110 THERAPEUTIC EXERCISES: CPT | Performed by: PHYSICAL THERAPIST

## 2022-11-07 NOTE — PROGRESS NOTES
Physical Therapy Daily Treatment Note    [address] [facility], [address]  [place] [address]    Patient: Pj Zuleta   : 1975  Referring practitioner: Carl Chiang MD  Date of Initial Visit: Type: THERAPY  Noted: 2022  Today's Date: 2022  Patient seen for 10 sessions       Visit Diagnoses:    ICD-10-CM ICD-9-CM   1. Right hip pain  M25.551 719.45   2. Acute bilateral low back pain with right-sided sciatica  M54.41 724.2     724.3     338.19       Subjective   Patient reports he is using heat as needed, doing manual traction of RLE as needed for pain control. States his AROM is better but continues to feel difference in sensation of RLE vs LLE and weakness of RLE. States he is scheduled to have lumbar MRI this week. States  his pain level is 5/10 upon arrival.      Objective   See Exercise, Manual, and Modality Logs for complete treatment.       Assessment/Plan   Patient continues to have increased RLE neural tension, limiting all functional mobility. Pain increased with exercise today from 5/10 on arrival to 6-7/10. Relief with right LAD; however temporary relief. Reviewed current HEP options for home and progressed with GTB, patient is compliant with current POC. Will decrease PT frequency until after imaging performed, increased emphasis on HEP.       Timed:         Manual Therapy:    9     mins  17690;     Therapeutic Exercise:    23     mins  43574;     Neuromuscular Preston:    0    mins  12962;    Therapeutic Activity:     0     mins  75317;     Gait Trainin     mins  75365;     Ultrasound:     0     mins  30328;    Ionto                               0    mins   78001  Self Care                       0     mins   50035  Canalith Repos    0     mins 58030      Un-Timed:  Electrical Stimulation:    0     mins  71544 ( );  Dry Needling     0     mins self-pay  Traction     0     mins 77331      Timed Treatment:   32   mins   Total Treatment:     32   mins    Corinne E.  Italia, PT  [place] License: [id number]

## 2022-11-10 ENCOUNTER — APPOINTMENT (OUTPATIENT)
Dept: MRI IMAGING | Facility: HOSPITAL | Age: 47
End: 2022-11-10

## 2022-12-10 ENCOUNTER — HOSPITAL ENCOUNTER (OUTPATIENT)
Dept: MRI IMAGING | Facility: HOSPITAL | Age: 47
Discharge: HOME | End: 2022-12-10
Admitting: INTERNAL MEDICINE

## 2022-12-10 DIAGNOSIS — M54.41 ACUTE RIGHT-SIDED LOW BACK PAIN WITH RIGHT-SIDED SCIATICA: ICD-10-CM

## 2022-12-10 PROCEDURE — 72148 MRI LUMBAR SPINE W/O DYE: CPT

## 2022-12-14 ENCOUNTER — TREATMENT (OUTPATIENT)
Dept: PHYSICAL THERAPY | Facility: CLINIC | Age: 47
End: 2022-12-14

## 2022-12-14 DIAGNOSIS — M25.551 RIGHT HIP PAIN: Primary | ICD-10-CM

## 2022-12-14 DIAGNOSIS — M54.41 ACUTE BILATERAL LOW BACK PAIN WITH RIGHT-SIDED SCIATICA: ICD-10-CM

## 2022-12-14 DIAGNOSIS — M25.559 PAIN, HIP: ICD-10-CM

## 2022-12-14 PROCEDURE — 97110 THERAPEUTIC EXERCISES: CPT | Performed by: PHYSICAL THERAPIST
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
## 2023-01-17 ENCOUNTER — OFFICE VISIT (OUTPATIENT)
Dept: INTERNAL MEDICINE | Facility: CLINIC | Age: 48
End: 2023-01-17
Payer: OTHER MISCELLANEOUS

## 2023-01-17 ENCOUNTER — TELEPHONE (OUTPATIENT)
Dept: INTERNAL MEDICINE | Facility: CLINIC | Age: 48
End: 2023-01-17

## 2023-01-17 VITALS
SYSTOLIC BLOOD PRESSURE: 130 MMHG | OXYGEN SATURATION: 98 % | TEMPERATURE: 98.4 F | WEIGHT: 289 LBS | HEART RATE: 75 BPM | BODY MASS INDEX: 39.2 KG/M2 | RESPIRATION RATE: 20 BRPM | DIASTOLIC BLOOD PRESSURE: 90 MMHG

## 2023-01-17 DIAGNOSIS — M47.817 DJD (DEGENERATIVE JOINT DISEASE), LUMBOSACRAL: ICD-10-CM

## 2023-01-17 DIAGNOSIS — G89.29 CHRONIC BILATERAL LOW BACK PAIN WITHOUT SCIATICA: Primary | ICD-10-CM

## 2023-01-17 DIAGNOSIS — M54.50 CHRONIC BILATERAL LOW BACK PAIN WITHOUT SCIATICA: Primary | ICD-10-CM
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
## 2023-01-17 PROCEDURE — 99213 OFFICE O/P EST LOW 20 MIN: CPT | Performed by: INTERNAL MEDICINE

## 2023-01-17 NOTE — TELEPHONE ENCOUNTER
Patient is asking referral to neuro surgeon Dr. Demetrio Fernandes and also to pain management Dr. Jose Mccall as was discussed at today's visit. Please let patient know when referrals processed.
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
## 2023-01-21 NOTE — TELEPHONE ENCOUNTER
Please tell patient that referrals have been placed and someone will contact him with appointment date and time

## 2023-01-26 ENCOUNTER — TREATMENT (OUTPATIENT)
Dept: PHYSICAL THERAPY | Facility: CLINIC | Age: 48
End: 2023-01-26
Payer: OTHER MISCELLANEOUS

## 2023-01-26 DIAGNOSIS — M25.551 RIGHT HIP PAIN: Primary | ICD-10-CM

## 2023-01-26 DIAGNOSIS — M54.41 ACUTE BILATERAL LOW BACK PAIN WITH RIGHT-SIDED SCIATICA: ICD-10-CM

## 2023-01-26 PROCEDURE — 97110 THERAPEUTIC EXERCISES: CPT | Performed by: PHYSICAL THERAPIST

## 2023-01-26 PROCEDURE — 97530 THERAPEUTIC ACTIVITIES: CPT | Performed by: PHYSICAL THERAPIST
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
## 2023-02-01 PROBLEM — M99.73 CONNECTIVE TISSUE AND DISC STENOSIS OF INTERVERTEBRAL FORAMINA OF LUMBAR REGION: Status: ACTIVE | Noted: 2023-02-01
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
## 2023-02-01 PROBLEM — M47.816 SPONDYLOSIS OF LUMBAR REGION WITHOUT MYELOPATHY OR RADICULOPATHY: Status: ACTIVE | Noted: 2023-02-01

## 2023-02-01 PROBLEM — M51.379 DEGENERATION OF LUMBAR OR LUMBOSACRAL INTERVERTEBRAL DISC: Status: ACTIVE | Noted: 2023-02-01

## 2023-02-01 PROBLEM — G47.01 INSOMNIA DUE TO MEDICAL CONDITION: Status: ACTIVE | Noted: 2023-02-01

## 2023-02-01 PROBLEM — M51.37 DEGENERATION OF LUMBAR OR LUMBOSACRAL INTERVERTEBRAL DISC: Status: ACTIVE | Noted: 2023-02-01

## 2023-02-02 ENCOUNTER — OFFICE VISIT (OUTPATIENT)
Dept: PAIN MEDICINE | Facility: CLINIC | Age: 48
End: 2023-02-02
Payer: COMMERCIAL

## 2023-02-02 VITALS
HEART RATE: 69 BPM | SYSTOLIC BLOOD PRESSURE: 159 MMHG | WEIGHT: 289.2 LBS | HEIGHT: 72 IN | DIASTOLIC BLOOD PRESSURE: 101 MMHG | OXYGEN SATURATION: 98 % | BODY MASS INDEX: 39.17 KG/M2 | TEMPERATURE: 95.5 F

## 2023-02-02 DIAGNOSIS — M48.062 LUMBAR STENOSIS WITH NEUROGENIC CLAUDICATION: Primary | ICD-10-CM

## 2023-02-02 DIAGNOSIS — F41.9 ANXIETY: ICD-10-CM

## 2023-02-02 DIAGNOSIS — M51.27 DISPLACEMENT OF INTERVERTEBRAL DISC OF LUMBOSACRAL REGION: ICD-10-CM

## 2023-02-02 DIAGNOSIS — M99.73 CONNECTIVE TISSUE AND DISC STENOSIS OF INTERVERTEBRAL FORAMINA OF LUMBAR REGION: ICD-10-CM

## 2023-02-02 DIAGNOSIS — G47.33 OBSTRUCTIVE APNEA: ICD-10-CM

## 2023-02-02 DIAGNOSIS — M48.062 LUMBAR STENOSIS WITH NEUROGENIC CLAUDICATION: ICD-10-CM

## 2023-02-02 DIAGNOSIS — G47.61 PERIODIC LIMB MOVEMENT: ICD-10-CM

## 2023-02-02 DIAGNOSIS — M47.816 SPONDYLOSIS OF LUMBAR REGION WITHOUT MYELOPATHY OR RADICULOPATHY: ICD-10-CM
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
## 2023-02-02 DIAGNOSIS — G47.01 INSOMNIA DUE TO MEDICAL CONDITION: ICD-10-CM

## 2023-02-02 PROCEDURE — 99204 OFFICE O/P NEW MOD 45 MIN: CPT | Performed by: ANESTHESIOLOGY

## 2023-02-02 RX ORDER — ME-TETRAHYDROFOLATE/B12/HRB236 1-1-500 MG
1 CAPSULE ORAL DAILY
Qty: 90 CAPSULE | Refills: 1 | Status: SHIPPED | OUTPATIENT
Start: 2023-02-02

## 2023-02-02 RX ORDER — ST. JOHN'S WORT 300 MG
400 CAPSULE ORAL 3 TIMES DAILY
Qty: 180 CAPSULE | Refills: 5 | Status: SHIPPED | OUTPATIENT
Start: 2023-02-02

## 2023-02-02 RX ORDER — MULTIVITAMIN WITH IRON
100 TABLET ORAL DAILY
Qty: 30 TABLET | Refills: 0 | Status: SHIPPED | OUTPATIENT
Start: 2023-02-02

## 2023-02-06 ENCOUNTER — OUTSIDE FACILITY SERVICE (OUTPATIENT)
Dept: PAIN MEDICINE | Facility: CLINIC | Age: 48
End: 2023-02-06
Payer: OTHER MISCELLANEOUS

## 2023-02-06 PROCEDURE — 62323 NJX INTERLAMINAR LMBR/SAC: CPT | Performed by: ANESTHESIOLOGY

## 2023-02-07 ENCOUNTER — TELEPHONE (OUTPATIENT)
Dept: PAIN MEDICINE | Facility: CLINIC | Age: 48
End: 2023-02-07
Payer: OTHER MISCELLANEOUS

## 2023-02-07 NOTE — TELEPHONE ENCOUNTER
FOLLOW-UP CALL AFTER PROCEDURE  Spoke with pt regarding how they are feeling after yesterday's procedure with Dr. Mccall. Patient advises that they are doing well.   Pain level before procedure: 7/10   Pain level after procedure: 0/10  Patient reports that the pain relief lasting for hours. (pain level 3/10)   Patient denies side effects or complications  Patient does not have any questions or concerns at this time. I advised pt of their next follow up with FALGUNI Cadena.

## 2023-02-14 ENCOUNTER — TREATMENT (OUTPATIENT)
Dept: PHYSICAL THERAPY | Facility: CLINIC | Age: 48
End: 2023-02-14
Payer: OTHER MISCELLANEOUS

## 2023-02-14 DIAGNOSIS — M54.41 ACUTE BILATERAL LOW BACK PAIN WITH RIGHT-SIDED SCIATICA: ICD-10-CM

## 2023-02-14 DIAGNOSIS — M25.559 PAIN, HIP: ICD-10-CM

## 2023-02-14 DIAGNOSIS — M25.551 RIGHT HIP PAIN: Primary | ICD-10-CM

## 2023-02-14 PROCEDURE — 97530 THERAPEUTIC ACTIVITIES: CPT | Performed by: PHYSICAL THERAPIST

## 2023-02-14 PROCEDURE — 97110 THERAPEUTIC EXERCISES: CPT | Performed by: PHYSICAL THERAPIST

## 2023-02-14 NOTE — PROGRESS NOTES
Physical Therapy Daily Treatment Note    [address] [facility], [address]  [place] [address]    Patient: Pj Zuleta   : 1975  Referring practitioner: Carl Chiang MD  Date of Initial Visit: Type: THERAPY  Noted: 2022  Today's Date: 2023  Patient seen for 13 sessions       Visit Diagnoses:    ICD-10-CM ICD-9-CM   1. Right hip pain  M25.551 719.45   2. Acute bilateral low back pain with right-sided sciatica  M54.41 724.2     724.3     338.19   3. Pain, hip  M25.559 719.45       Subjective   Patient reports improvement with injections last Monday, states his overall pain went from 7/10 to 2/10. States pain comes and goes, states sleep is still difficult. States he has discomfort at night, still using heating pad and pillows supporting his legs. States he is using CPAP, states he has weird sleep patterns. States his pain level is 2/10 upon arrival in midline low back and lateral R hip. States he has started pool exercises at gym, feels good. States he is walking about 12 laps at gym (16=1 mile).     Objective   See Exercise, Manual, and Modality Logs for complete treatment.       Assessment/Plan  Patient verbalized significant relief after epidural last week. He has been able to increase total time in pool and walking at gym prior to increased RLE symptoms. Progressed hip strengthening exercises in clinic to include resistance with side stepping. Reviewed lumbar stretches for improved mobility. He denies radicular symptoms in clinic today, pain remained midline low back and lateral R hip. Follow up in 2 weeks to further progress current HEP as tolerated. Progress to include multidirectional weight shifting exercise next visit as tolerated.     Timed:         Manual Therapy:    0     mins  86798;     Therapeutic Exercise:    23     mins  92356;     Neuromuscular Preston:    0    mins  02614;    Therapeutic Activity:     12     mins  85013;     Gait Trainin     mins  06278;      Ultrasound:     0     mins  15401;    Ionto                               0    mins   90131  Self Care                       0     mins   27222  Canalith Repos    0     mins 15704      Un-Timed:  Electrical Stimulation:    0     mins  62121 ( );  Dry Needling     0     mins self-pay  Traction     0     mins 43390      Timed Treatment:   35   mins   Total Treatment:     35   mins    Corinne E. Perkins, PT  [place] License: [id number]

## 2023-03-01 ENCOUNTER — TREATMENT (OUTPATIENT)
Dept: PHYSICAL THERAPY | Facility: CLINIC | Age: 48
End: 2023-03-01
Payer: OTHER MISCELLANEOUS

## 2023-03-01 DIAGNOSIS — M25.559 PAIN, HIP: ICD-10-CM

## 2023-03-01 DIAGNOSIS — M25.551 RIGHT HIP PAIN: Primary | ICD-10-CM

## 2023-03-01 DIAGNOSIS — M54.41 ACUTE BILATERAL LOW BACK PAIN WITH RIGHT-SIDED SCIATICA: ICD-10-CM

## 2023-03-01 PROCEDURE — 97530 THERAPEUTIC ACTIVITIES: CPT | Performed by: PHYSICAL THERAPIST

## 2023-03-01 PROCEDURE — 97110 THERAPEUTIC EXERCISES: CPT | Performed by: PHYSICAL THERAPIST
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
## 2023-03-13 ENCOUNTER — OFFICE VISIT (OUTPATIENT)
Dept: INTERNAL MEDICINE | Facility: CLINIC | Age: 48
End: 2023-03-13
Payer: COMMERCIAL

## 2023-03-13 VITALS
HEART RATE: 72 BPM | HEIGHT: 72 IN | BODY MASS INDEX: 39.28 KG/M2 | TEMPERATURE: 97.3 F | RESPIRATION RATE: 20 BRPM | WEIGHT: 290 LBS | DIASTOLIC BLOOD PRESSURE: 84 MMHG | SYSTOLIC BLOOD PRESSURE: 120 MMHG

## 2023-03-13 DIAGNOSIS — F32.A DEPRESSION, UNSPECIFIED DEPRESSION TYPE: ICD-10-CM
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
## 2023-03-13 DIAGNOSIS — M54.50 CHRONIC BILATERAL LOW BACK PAIN WITHOUT SCIATICA: ICD-10-CM

## 2023-03-13 DIAGNOSIS — M47.817 DJD (DEGENERATIVE JOINT DISEASE), LUMBOSACRAL: ICD-10-CM

## 2023-03-13 DIAGNOSIS — G89.29 CHRONIC BILATERAL LOW BACK PAIN WITHOUT SCIATICA: ICD-10-CM

## 2023-03-13 PROCEDURE — 99213 OFFICE O/P EST LOW 20 MIN: CPT | Performed by: INTERNAL MEDICINE
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
## 2023-04-03 ENCOUNTER — HOSPITAL ENCOUNTER (OUTPATIENT)
Dept: NUTRITION | Facility: HOSPITAL | Age: 48
Setting detail: RECURRING SERIES
Discharge: HOME | End: 2023-04-03

## 2023-04-03 PROCEDURE — 97802 MEDICAL NUTRITION INDIV IN: CPT
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
## 2023-04-24 ENCOUNTER — OFFICE VISIT (OUTPATIENT)
Dept: PAIN MEDICINE | Facility: CLINIC | Age: 48
End: 2023-04-24
Payer: COMMERCIAL

## 2023-04-24 VITALS — HEIGHT: 72 IN | WEIGHT: 291 LBS | OXYGEN SATURATION: 98 % | BODY MASS INDEX: 39.42 KG/M2 | HEART RATE: 76 BPM

## 2023-04-24 DIAGNOSIS — F41.9 ANXIETY: ICD-10-CM

## 2023-04-24 DIAGNOSIS — M48.062 LUMBAR STENOSIS WITH NEUROGENIC CLAUDICATION: ICD-10-CM

## 2023-04-24 DIAGNOSIS — M47.816 SPONDYLOSIS OF LUMBAR REGION WITHOUT MYELOPATHY OR RADICULOPATHY: ICD-10-CM

## 2023-04-24 DIAGNOSIS — M51.27 DISPLACEMENT OF INTERVERTEBRAL DISC OF LUMBOSACRAL REGION: ICD-10-CM

## 2023-04-24 DIAGNOSIS — M99.73 CONNECTIVE TISSUE AND DISC STENOSIS OF INTERVERTEBRAL FORAMINA OF LUMBAR REGION: ICD-10-CM
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
## 2023-04-24 PROCEDURE — 99213 OFFICE O/P EST LOW 20 MIN: CPT | Performed by: NURSE PRACTITIONER
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
## 2023-05-30 ENCOUNTER — OFFICE VISIT (OUTPATIENT)
Dept: NEUROSURGERY | Facility: CLINIC | Age: 48
End: 2023-05-30

## 2023-05-30 VITALS — WEIGHT: 294 LBS | BODY MASS INDEX: 39.82 KG/M2 | HEIGHT: 72 IN | TEMPERATURE: 97.8 F

## 2023-05-30 DIAGNOSIS — M54.9 MECHANICAL BACK PAIN: ICD-10-CM

## 2023-05-30 DIAGNOSIS — M54.16 LUMBAR RADICULOPATHY: ICD-10-CM

## 2023-05-30 DIAGNOSIS — M51.36 DISC DEGENERATION, LUMBAR: Primary | ICD-10-CM

## 2023-05-30 DIAGNOSIS — M47.819 FACET ARTHROPATHY: ICD-10-CM

## 2023-05-30 PROCEDURE — 99204 OFFICE O/P NEW MOD 45 MIN: CPT | Performed by: PHYSICIAN ASSISTANT
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
## 2023-05-31 ENCOUNTER — HOSPITAL ENCOUNTER (OUTPATIENT)
Dept: NEUROLOGY | Facility: HOSPITAL | Age: 48
Discharge: HOME | End: 2023-05-31
Admitting: ANESTHESIOLOGY

## 2023-05-31 DIAGNOSIS — M48.062 LUMBAR STENOSIS WITH NEUROGENIC CLAUDICATION: ICD-10-CM

## 2023-05-31 PROCEDURE — 95910 NRV CNDJ TEST 7-8 STUDIES: CPT

## 2023-05-31 PROCEDURE — 95886 MUSC TEST DONE W/N TEST COMP: CPT

## 2023-09-05 ENCOUNTER — OFFICE VISIT (OUTPATIENT)
Dept: INTERNAL MEDICINE | Facility: CLINIC | Age: 48
End: 2023-09-05
Payer: COMMERCIAL

## 2023-09-05 VITALS
RESPIRATION RATE: 22 BRPM | WEIGHT: 292 LBS | BODY MASS INDEX: 39.55 KG/M2 | TEMPERATURE: 100.5 F | HEART RATE: 107 BPM | SYSTOLIC BLOOD PRESSURE: 138 MMHG | HEIGHT: 72 IN | OXYGEN SATURATION: 96 % | DIASTOLIC BLOOD PRESSURE: 80 MMHG

## 2023-09-05 DIAGNOSIS — R19.7 DIARRHEA, UNSPECIFIED TYPE: ICD-10-CM

## 2023-09-05 DIAGNOSIS — R51.9 ACUTE NONINTRACTABLE HEADACHE, UNSPECIFIED HEADACHE TYPE: ICD-10-CM

## 2023-09-05 DIAGNOSIS — R11.0 NAUSEA: ICD-10-CM

## 2023-09-05 DIAGNOSIS — R50.9 FEVER, UNSPECIFIED FEVER CAUSE: Primary | ICD-10-CM

## 2023-09-05 DIAGNOSIS — R52 BODY ACHES: ICD-10-CM

## 2023-09-05 LAB
EXPIRATION DATE: NORMAL
EXPIRATION DATE: NORMAL
FLUAV AG NPH QL: NEGATIVE
FLUBV AG NPH QL: NEGATIVE
INTERNAL CONTROL: NORMAL
INTERNAL CONTROL: NORMAL
Lab: NORMAL
Lab: NORMAL
S PYO AG THROAT QL: NEGATIVE

## 2023-09-05 PROCEDURE — 87635 SARS-COV-2 COVID-19 AMP PRB: CPT | Performed by: PHYSICIAN ASSISTANT

## 2023-09-05 RX ORDER — ONDANSETRON 4 MG/1
4 TABLET, ORALLY DISINTEGRATING ORAL EVERY 8 HOURS PRN
Qty: 20 TABLET | Refills: 0 | Status: SHIPPED | OUTPATIENT
Start: 2023-09-05

## 2023-09-05 NOTE — PROGRESS NOTES
Office Note     Name: Pj Zuleta    : 1975     MRN: [id number]     Chief Complaint  Headache, Fever, Generalized Body Aches, Diarrhea, and Fatigue (X1 day )    Subjective     History of Present Illness:  Pj Zuleta is a 48 y.o. male.    The patient presents today for headaches, fever, body aches, diarrhea and fatigue.    His COVID-19 and influenza test are negative.  He confirms the use of Motrin. He denies any cough or shortness of breath. Patient denies any additional symptoms, alleviating or aggravating factors.    Past Medical History:   Past Medical History:   Diagnosis Date    Asthma     Depression     Hypertension        Past Surgical History:   Past Surgical History:   Procedure Laterality Date    NO PAST SURGERIES  2023       Immunizations:   Immunization History   Administered Date(s) Administered    COVID-19 (PFIZER) BIVALENT 12+YRS 09/15/2022    COVID-19 (PFIZER) Purple Cap Monovalent 2021, 2021, 10/19/2021    Fluzone >6mos 10/14/2020, 09/15/2022    Fluzone Quad >6mos (Multi-dose) 2019    Hepatitis A 2019    Influenza Quad Vaccine (Inpatient) 2019    Pneumococcal Conjugate 20-Valent (PCV20) 09/15/2022    Pneumococcal Polysaccharide (PPSV23) 2019    influenza Split 2016        Medications:     Current Outpatient Medications:     acetaminophen (TYLENOL) 325 MG tablet, Take 1 tablet by mouth Every 6 (Six) Hours As Needed for Mild Pain., Disp: , Rfl:     albuterol (ACCUNEB) 1.25 MG/3ML nebulizer solution, Take 3 mL by nebulization Every 6 (Six) Hours As Needed for Wheezing., Disp: 30 each, Rfl: 1    albuterol sulfate  (90 Base) MCG/ACT inhaler, Inhale 2 puffs Every 4 (Four) Hours As Needed for Wheezing., Disp: 18 g, Rfl: 4    Alpha Lipoic Acid 200 MG capsule, Take 400 mg by mouth 3 (Three) Times a Day., Disp: 180 capsule, Rfl: 5    amLODIPine (NORVASC) 10 MG tablet, Take 1 tablet by mouth Daily., Disp: 90 tablet,  Rfl: 2    budesonide-formoterol (Symbicort) 160-4.5 MCG/ACT inhaler, Inhale 2 puffs 2 (Two) Times a Day., Disp: 10.2 g, Rfl: 4    cyclobenzaprine (FLEXERIL) 10 MG tablet, Take 1 tablet by mouth 3 (Three) Times a Day As Needed for Muscle Spasms. (Patient taking differently: Take 1 tablet by mouth As Needed for Muscle Spasms.), Disp: 45 tablet, Rfl: 3    Dietary Management Product (Rheumate) capsule, Take 1 capsule by mouth Daily., Disp: 90 capsule, Rfl: 1    DULoxetine (Cymbalta) 30 MG capsule, Take 1 capsule by mouth Daily., Disp: 90 capsule, Rfl: 2    fluticasone (FLONASE) 50 MCG/ACT nasal spray, 2 sprays into the nostril(s) as directed by provider Daily., Disp: 15.8 mL, Rfl: 4    ibuprofen (ADVIL,MOTRIN) 600 MG tablet, Take 1 tablet by mouth Every 6 (Six) Hours As Needed for Mild Pain., Disp: 60 tablet, Rfl: 2    montelukast (SINGULAIR) 10 MG tablet, Take 1 tablet by mouth Daily., Disp: 30 tablet, Rfl: 6    multivitamin with minerals tablet tablet, Take 1 tablet by mouth Daily., Disp: , Rfl:     vitamin B-12 (CYANOCOBALAMIN) 500 MCG tablet, Take 1 tablet by mouth Daily., Disp: , Rfl:     vitamin B-6 (PYRIDOXINE) 100 MG tablet, Take 1 tablet by mouth Daily., Disp: 30 tablet, Rfl: 0    ondansetron ODT (ZOFRAN-ODT) 4 MG disintegrating tablet, Place 1 tablet on the tongue Every 8 (Eight) Hours As Needed for Nausea or Vomiting., Disp: 20 tablet, Rfl: 0    Allergies:   Allergies   Allergen Reactions    Shellfish-Derived Products Unknown - Low Severity       Family History:   Family History   Problem Relation Age of Onset    Asthma Mother     Heart attack Father     Hypertension Father     Lung cancer Father     Alzheimer's disease Father     Alcohol abuse Father     Asthma Father     Cancer Father        Social History:   Social History     Socioeconomic History    Marital status: Single   Tobacco Use    Smoking status: Former     Types: Cigars    Smokeless tobacco: Never   Vaping Use    Vaping Use: Never used  "  Substance and Sexual Activity    Alcohol use: Yes     Comment: social    Drug use: Not Currently     Types: Marijuana    Sexual activity: Defer       Objective     Vital Signs  /80 (BP Location: Right arm, Patient Position: Sitting, Cuff Size: Adult)   Pulse 107   Temp 100.5 °F (38.1 °C) (Infrared)   Resp 22   Ht 182.9 cm (72.01\")   Wt 132 kg (292 lb)   SpO2 96%   BMI 39.59 kg/m²   Estimated body mass index is 39.59 kg/m² as calculated from the following:    Height as of this encounter: 182.9 cm (72.01\").    Weight as of this encounter: 132 kg (292 lb).            Physical Exam  Vitals and nursing note reviewed.   Constitutional:       Appearance: Normal appearance.   HENT:      Right Ear: Tympanic membrane normal.      Left Ear: Tympanic membrane normal.      Nose: Nose normal.      Mouth/Throat:      Mouth: Mucous membranes are moist.      Pharynx: Oropharynx is clear.   Eyes:      Extraocular Movements: Extraocular movements intact.      Conjunctiva/sclera: Conjunctivae normal.      Pupils: Pupils are equal, round, and reactive to light.   Cardiovascular:      Rate and Rhythm: Normal rate and regular rhythm.      Pulses: Normal pulses.      Heart sounds: Normal heart sounds.   Pulmonary:      Effort: Pulmonary effort is normal.      Breath sounds: Normal breath sounds.   Skin:     General: Skin is warm and dry.   Neurological:      General: No focal deficit present.      Mental Status: He is alert.   Psychiatric:         Mood and Affect: Mood normal.         Behavior: Behavior normal.        Assessment and Plan     1. Fever, unspecified fever cause    - POC Influenza A / B; Future  - POC Rapid Strep A; Future  - COVID-19 PCR, [facility], NP SWAB IN [facility] VIRAL [facility]SPORT MEDIA/ORAL SWISH 24-30 HR TAT - Swab, Nasopharynx; Future  He may take over-the-counter Coricidin. He was encouraged to rest increase his fluid intake and avoid being around others. He will be prescribed Zofran. If he is COVID-19 " positive he will be prescribed Paxlovid.     2. Body aches    - POC Influenza A / B; Future  - POC Rapid Strep A; Future  - COVID-19 PCR, [facility], NP SWAB IN [facility] VIRAL TRANSPORT MEDIA/ORAL SWISH 24-30 HR TAT - Swab, Nasopharynx; Future    3. Acute nonintractable headache, unspecified headache type    - POC Influenza A / B; Future  - POC Rapid Strep A; Future  - COVID-19 PCR, [facility], NP SWAB IN [facility] VIRAL TRANSPORT MEDIA/ORAL SWISH 24-30 HR TAT - Swab, Nasopharynx; Future    4. Diarrhea, unspecified type    - POC Influenza A / B; Future  - POC Rapid Strep A; Future  - COVID-19 PCR, [facility], NP SWAB IN [facility] VIRAL TRANSPORT MEDIA/ORAL SWISH 24-30 HR TAT - Swab, Nasopharynx; Future  - ondansetron ODT (ZOFRAN-ODT) 4 MG disintegrating tablet; Place 1 tablet on the tongue Every 8 (Eight) Hours As Needed for Nausea or Vomiting.  Dispense: 20 tablet; Refill: 0    5. Nausea    - POC Influenza A / B; Future  - POC Rapid Strep A; Future  - COVID-19 PCR, [facility], NP SWAB IN [facility] VIRAL TRANSPORT MEDIA/ORAL SWISH 24-30 HR TAT - Swab, Nasopharynx; Future  - ondansetron ODT (ZOFRAN-ODT) 4 MG disintegrating tablet; Place 1 tablet on the tongue Every 8 (Eight) Hours As Needed for Nausea or Vomiting.  Dispense: 20 tablet; Refill: 0     Patient verbalized good understanding of diagnosis and treatment plan.  All questions answered to their satisfaction.    Patient counseled on the side effects of prescribed medication and verbalized good understanding.  Recommended taking probiotics while taking antibiotics.  Patient is to call office for any new or worsening symptoms.  Patient verbalized understanding of indications to report to the ER.      Red flag symptoms and indications to report to ER discussed with patient who verbalized good understanding.       Follow Up  No follow-ups on file.    JUDI Ramos MARIA VICTORIA [facility]  [address]  [facility] [address]  [phone]  Transcribed from ambient dictation for Margot Sewell PA-C by Fabi Emanuel.  09/05/23   12:58 EDT    Patient or patient representative verbalized consent to the visit recording.  I have personally performed the services described in this document as transcribed by the above individual, and it is both accurate and complete.

## 2023-09-06 ENCOUNTER — TELEPHONE (OUTPATIENT)
Dept: INTERNAL MEDICINE | Facility: CLINIC | Age: 48
End: 2023-09-06
Payer: OTHER MISCELLANEOUS

## 2023-09-06 LAB — SARS-COV-2 RNA NOSE QL NAA+PROBE: NOT DETECTED

## 2023-09-07 NOTE — TELEPHONE ENCOUNTER
PA denied:  You do not meet the requirements of your plan.  Your plan covers additional quantities of this drug when you meet one of these conditions:  - You have hyperemesis gravidarum, are at risk for a hospital stay, and your request is for  Zofran, Zuplenz or ondansetron  - You are receiving radiation therapy  - You are receiving moderate to highly emetogenic chemotherapy  Your request has been denied based on the information we have.

## 2023-09-28 DIAGNOSIS — J18.9 PNEUMONIA OF LEFT LOWER LOBE DUE TO INFECTIOUS ORGANISM: ICD-10-CM

## 2023-09-28 DIAGNOSIS — J45.20 MILD INTERMITTENT ASTHMA WITHOUT COMPLICATION: ICD-10-CM

## 2023-09-28 RX ORDER — ALBUTEROL SULFATE 90 UG/1
AEROSOL, METERED RESPIRATORY (INHALATION)
Qty: 8.5 G | Refills: 2 | Status: SHIPPED | OUTPATIENT
Start: 2023-09-28
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
## 2024-02-14 DIAGNOSIS — J30.1 SEASONAL ALLERGIC RHINITIS DUE TO POLLEN: ICD-10-CM

## 2024-02-14 DIAGNOSIS — I10 PRIMARY HYPERTENSION: ICD-10-CM

## 2024-02-14 DIAGNOSIS — F32.A DEPRESSION, UNSPECIFIED DEPRESSION TYPE: ICD-10-CM

## 2024-02-14 RX ORDER — FLUTICASONE PROPIONATE 50 MCG
2 SPRAY, SUSPENSION (ML) NASAL DAILY
Qty: 15.8 ML | Refills: 4 | Status: SHIPPED | OUTPATIENT
Start: 2024-02-14

## 2024-02-14 RX ORDER — AMLODIPINE BESYLATE 10 MG/1
10 TABLET ORAL DAILY
Qty: 90 TABLET | Refills: 2 | Status: SHIPPED | OUTPATIENT
Start: 2024-02-14

## 2024-02-14 RX ORDER — DULOXETIN HYDROCHLORIDE 30 MG/1
30 CAPSULE, DELAYED RELEASE ORAL DAILY
Qty: 90 CAPSULE | Refills: 2 | Status: SHIPPED | OUTPATIENT
Start: 2024-02-14

## 2024-05-01 ENCOUNTER — OFFICE VISIT (OUTPATIENT)
Dept: INTERNAL MEDICINE | Facility: CLINIC | Age: 49
End: 2024-05-01
Payer: COMMERCIAL

## 2024-05-01 VITALS
BODY MASS INDEX: 40.76 KG/M2 | DIASTOLIC BLOOD PRESSURE: 102 MMHG | HEIGHT: 71 IN | SYSTOLIC BLOOD PRESSURE: 146 MMHG | HEART RATE: 72 BPM | RESPIRATION RATE: 18 BRPM | TEMPERATURE: 98.4 F | WEIGHT: 291.13 LBS

## 2024-05-01 DIAGNOSIS — F32.A DEPRESSION, UNSPECIFIED DEPRESSION TYPE: ICD-10-CM

## 2024-05-01 DIAGNOSIS — J18.9 PNEUMONIA OF LEFT LOWER LOBE DUE TO INFECTIOUS ORGANISM: ICD-10-CM

## 2024-05-01 DIAGNOSIS — Z12.5 SCREENING PSA (PROSTATE SPECIFIC ANTIGEN): ICD-10-CM

## 2024-05-01 DIAGNOSIS — J45.20 MILD INTERMITTENT ASTHMA WITHOUT COMPLICATION: ICD-10-CM

## 2024-05-01 DIAGNOSIS — Z12.11 SCREENING FOR COLON CANCER: Primary | ICD-10-CM

## 2024-05-01 DIAGNOSIS — I10 PRIMARY HYPERTENSION: ICD-10-CM

## 2024-05-01 LAB
ALBUMIN SERPL-MCNC: 4.6 G/DL (ref 3.5–5.2)
ALBUMIN/GLOB SERPL: 1.5 G/DL
ALP SERPL-CCNC: 109 U/L (ref 39–117)
ALT SERPL W P-5'-P-CCNC: 29 U/L (ref 1–41)
ANION GAP SERPL CALCULATED.3IONS-SCNC: 10 MMOL/L (ref 5–15)
AST SERPL-CCNC: 18 U/L (ref 1–40)
BILIRUB SERPL-MCNC: 0.5 MG/DL (ref 0–1.2)
BUN SERPL-MCNC: 12 MG/DL (ref 6–20)
BUN/CREAT SERPL: 10.4 (ref 7–25)
CALCIUM SPEC-SCNC: 9.2 MG/DL (ref 8.6–10.5)
CHLORIDE SERPL-SCNC: 103 MMOL/L (ref 98–107)
CHOLEST SERPL-MCNC: 167 MG/DL (ref 0–200)
CO2 SERPL-SCNC: 26 MMOL/L (ref 22–29)
CREAT SERPL-MCNC: 1.15 MG/DL (ref 0.76–1.27)
DEPRECATED RDW RBC AUTO: 43.5 FL (ref 37–54)
EGFRCR SERPLBLD CKD-EPI 2021: 77.1 ML/MIN/1.73
ERYTHROCYTE [DISTWIDTH] IN BLOOD BY AUTOMATED COUNT: 13.2 % (ref 12.3–15.4)
GLOBULIN UR ELPH-MCNC: 3.1 GM/DL
GLUCOSE SERPL-MCNC: 76 MG/DL (ref 65–99)
HCT VFR BLD AUTO: 47.5 % (ref 37.5–51)
HDLC SERPL-MCNC: 36 MG/DL (ref 40–60)
HGB BLD-MCNC: 15.6 G/DL (ref 13–17.7)
LDLC SERPL CALC-MCNC: 106 MG/DL (ref 0–100)
LDLC/HDLC SERPL: 2.86 {RATIO}
MCH RBC QN AUTO: 29.6 PG (ref 26.6–33)
MCHC RBC AUTO-ENTMCNC: 32.8 G/DL (ref 31.5–35.7)
MCV RBC AUTO: 90.1 FL (ref 79–97)
PLATELET # BLD AUTO: 202 10*3/MM3 (ref 140–450)
PMV BLD AUTO: 10 FL (ref 6–12)
POTASSIUM SERPL-SCNC: 4.1 MMOL/L (ref 3.5–5.2)
PROT SERPL-MCNC: 7.7 G/DL (ref 6–8.5)
PSA SERPL-MCNC: 0.9 NG/ML (ref 0–4)
RBC # BLD AUTO: 5.27 10*6/MM3 (ref 4.14–5.8)
SODIUM SERPL-SCNC: 139 MMOL/L (ref 136–145)
T4 FREE SERPL-MCNC: 1.22 NG/DL (ref 0.93–1.7)
TRIGL SERPL-MCNC: 140 MG/DL (ref 0–150)
TSH SERPL DL<=0.05 MIU/L-ACNC: 1.99 UIU/ML (ref 0.27–4.2)
VLDLC SERPL-MCNC: 25 MG/DL (ref 5–40)
WBC NRBC COR # BLD AUTO: 4.95 10*3/MM3 (ref 3.4–10.8)

## 2024-05-01 PROCEDURE — 80061 LIPID PANEL: CPT | Performed by: INTERNAL MEDICINE

## 2024-05-01 PROCEDURE — G0103 PSA SCREENING: HCPCS | Performed by: INTERNAL MEDICINE

## 2024-05-01 PROCEDURE — 84439 ASSAY OF FREE THYROXINE: CPT | Performed by: INTERNAL MEDICINE

## 2024-05-01 PROCEDURE — 99396 PREV VISIT EST AGE 40-64: CPT | Performed by: INTERNAL MEDICINE

## 2024-05-01 PROCEDURE — 80050 GENERAL HEALTH PANEL: CPT | Performed by: INTERNAL MEDICINE

## 2024-05-01 RX ORDER — HYDROCHLOROTHIAZIDE 25 MG/1
25 TABLET ORAL DAILY
Qty: 90 TABLET | Refills: 1 | Status: SHIPPED | OUTPATIENT
Start: 2024-05-01

## 2024-05-01 RX ORDER — ALBUTEROL SULFATE 90 UG/1
2 AEROSOL, METERED RESPIRATORY (INHALATION) EVERY 6 HOURS PRN
Qty: 8.5 G | Refills: 5 | Status: SHIPPED | OUTPATIENT
Start: 2024-05-01

## 2024-05-01 RX ORDER — ALBUTEROL SULFATE 2.5 MG/3ML
2.5 SOLUTION RESPIRATORY (INHALATION) EVERY 4 HOURS PRN
Qty: 30 EACH | Refills: 4 | Status: SHIPPED | OUTPATIENT
Start: 2024-05-01

## 2024-05-01 RX ORDER — DULOXETIN HYDROCHLORIDE 60 MG/1
60 CAPSULE, DELAYED RELEASE ORAL DAILY
Qty: 90 CAPSULE | Refills: 1 | Status: SHIPPED | OUTPATIENT
Start: 2024-05-01

## 2024-05-01 NOTE — PROGRESS NOTES
Chief Complaint  Annual Exam    Subjective    Pj Zuleta is a 49 y.o. male.     Pj Zuleta presents to [facility] for           Complete Adult Physical  History of Present Illness  The patient is here for his complete annual physical.    The patient expresses a desire to lose weight, despite adhering to a healthy diet and acknowledges the need for increased physical activity. He acknowledges the need for consistent diet and exercise, however, his personal challenges have negatively impacted his weight loss efforts.    The patient is experiencing mild depression, which he attributes to various lifestyle changes. He acknowledges the need for support and encouragement in his weight loss journey. 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 He is currently undergoing counseling.         Diet; Regular        Exercise: Needs to get more exercise   Physical Exam  Patient is alert times 3, in no distress.  Head is normocephalic and atraumatic. Pupils are equal to light. Extraocular muscles intact. Moist membranes.  Neck is supple. No cervical lymphadenopathy or goiter.  Chest is clear to auscultation. No rhonchi or wheeze.  S1, S2. No murmurs, rubs, or gallop.  Positive bowel sounds, soft, no mass or tenderness.  Peripheral vascular, +2 pulses, warm extremity, good perfusion. No testicular mass and no inguinal hernia noted.  Musculoskeletal exam, plus 5 out of 5 both upper and lower proximal distribution.  Cranial nerves intact, +2 DTRs.         Social History No smoking, no drugs,  no marijuana        Preventative Screenings:up to date   Results           Immunizations: up to date.    Assessment & Plan  1. Hypertension.  The patient's blood pressure is not at the desired level, suggesting the potential benefit of adding a medication or a titration dose.  The patient will be initiated on a regimen of hydrochlorothiazide 25 mg, to be taken orally once daily. The potential side effects and precautions were thoroughly discussed with the patient. He is also advised to closely monitor his blood pressure and provide me with his readings during the next visit.    2. Stress and depression.  The patient is currently on a regimen of Cymbalta 30 mg, however, the dosage will be increased to 60 mg. The patient is instructed to monitor his symptoms closely. The side effects and precautions should be closely monitored.    3. Annual physical examination.  The patient is scheduled for laboratory tests today, which will include a CBC, CMP, TSH, free T4, lipid profile, and PSA for prostate cancer screening.    Follow-up  The patient is scheduled for a follow-up visit in 6 weeks, or sooner if necessary.                  Follow Up   Return in about 1 year (around 5/1/2025) for Annual.  Patient was given instructions and counseling regarding his condition or for health maintenance advice. Please see specific information pulled into the AVS if appropriate.

## 2024-06-11 RX ORDER — SODIUM PICOSULFATE, MAGNESIUM OXIDE, AND ANHYDROUS CITRIC ACID 10; 3.5; 12 MG/160ML; G/160ML; G/160ML
350 LIQUID ORAL TAKE AS DIRECTED
Qty: 350 ML | Refills: 0 | Status: SHIPPED | OUTPATIENT
Start: 2024-06-11

## 2024-06-24 ENCOUNTER — OUTSIDE FACILITY SERVICE (OUTPATIENT)
Dept: GASTROENTEROLOGY | Facility: CLINIC | Age: 49
End: 2024-06-24
Payer: COMMERCIAL

## 2024-06-24 PROCEDURE — 88305 TISSUE EXAM BY PATHOLOGIST: CPT | Performed by: INTERNAL MEDICINE

## 2024-06-24 PROCEDURE — 45385 COLONOSCOPY W/LESION REMOVAL: CPT | Performed by: INTERNAL MEDICINE

## 2024-06-25 ENCOUNTER — LAB REQUISITION (OUTPATIENT)
Dept: LAB | Facility: HOSPITAL | Age: 49
End: 2024-06-25
Payer: COMMERCIAL

## 2024-06-25 DIAGNOSIS — Z12.11 ENCOUNTER FOR SCREENING FOR MALIGNANT NEOPLASM OF COLON: ICD-10-CM

## 2024-06-25 DIAGNOSIS — D12.2 BENIGN NEOPLASM OF ASCENDING COLON: ICD-10-CM

## 2024-06-25 DIAGNOSIS — K64.8 OTHER HEMORRHOIDS: ICD-10-CM

## 2024-06-26 LAB — REF LAB TEST METHOD: NORMAL

## 2025-03-19 DIAGNOSIS — I10 PRIMARY HYPERTENSION: ICD-10-CM

## 2025-03-19 RX ORDER — AMLODIPINE BESYLATE 10 MG/1
10 TABLET ORAL DAILY
Qty: 90 TABLET | Refills: 2 | OUTPATIENT
Start: 2025-03-19

## 2025-03-19 RX ORDER — HYDROCHLOROTHIAZIDE 25 MG/1
25 TABLET ORAL DAILY
Qty: 90 TABLET | Refills: 1 | OUTPATIENT
Start: 2025-03-19

## 2025-03-31 DIAGNOSIS — J45.20 MILD INTERMITTENT ASTHMA WITHOUT COMPLICATION: ICD-10-CM

## 2025-03-31 DIAGNOSIS — J18.9 PNEUMONIA OF LEFT LOWER LOBE DUE TO INFECTIOUS ORGANISM: ICD-10-CM

## 2025-03-31 DIAGNOSIS — I10 PRIMARY HYPERTENSION: ICD-10-CM

## 2025-03-31 NOTE — TELEPHONE ENCOUNTER
Caller: Pj Zuleta    Relationship: Self    Best call back number: [phone]     Requested Prescriptions:   Requested Prescriptions     Pending Prescriptions Disp Refills    albuterol sulfate  (90 Base) MCG/ACT inhaler 8.5 g 5     Sig: Inhale 2 puffs Every 6 (Six) Hours As Needed for Wheezing.    amLODIPine (NORVASC) 10 MG tablet 90 tablet 2     Sig: Take 1 tablet by mouth Daily.        Pharmacy where request should be sent: [id number] - [place], [place] - [address]  - [phone] [facility] [phone] FX     Last office visit with prescribing clinician: 5/1/2024   Last telemedicine visit with prescribing clinician: Visit date not found   Next office visit with prescribing clinician: 6/24/2025     Additional details provided by patient: PATIENT STATES HE IS OUT OF HIS INHALER. PATIENT STATES HE HAS BOUT 20 TABLETS LEFT OF AMLODIPINE.    Does the patient have less than a 3 day supply:  [] Yes  [x] No    Would you like a call back once the refill request has been completed: [] Yes [x] No    If the office needs to give you a call back, can they leave a voicemail: [] Yes [x] No    Bernardo Mckinney Rep   03/31/25 15:42 EDT

## 2025-04-01 RX ORDER — ALBUTEROL SULFATE 90 UG/1
2 INHALANT RESPIRATORY (INHALATION) EVERY 6 HOURS PRN
Qty: 8.5 G | Refills: 5 | Status: SHIPPED | OUTPATIENT
Start: 2025-04-01

## 2025-04-01 RX ORDER — AMLODIPINE BESYLATE 10 MG/1
10 TABLET ORAL DAILY
Qty: 90 TABLET | Refills: 2 | Status: SHIPPED | OUTPATIENT
Start: 2025-04-01

## 2025-06-24 ENCOUNTER — OFFICE VISIT (OUTPATIENT)
Dept: INTERNAL MEDICINE | Facility: CLINIC | Age: 50
End: 2025-06-24
Payer: COMMERCIAL

## 2025-06-24 VITALS
BODY MASS INDEX: 42.42 KG/M2 | HEIGHT: 71 IN | SYSTOLIC BLOOD PRESSURE: 134 MMHG | RESPIRATION RATE: 16 BRPM | HEART RATE: 88 BPM | TEMPERATURE: 98.4 F | DIASTOLIC BLOOD PRESSURE: 88 MMHG | WEIGHT: 303 LBS

## 2025-06-24 DIAGNOSIS — D22.9 NEVUS: ICD-10-CM

## 2025-06-24 DIAGNOSIS — Z13.220 LIPID SCREENING: ICD-10-CM

## 2025-06-24 DIAGNOSIS — I10 PRIMARY HYPERTENSION: ICD-10-CM

## 2025-06-24 DIAGNOSIS — E66.813 CLASS 3 SEVERE OBESITY DUE TO EXCESS CALORIES WITHOUT SERIOUS COMORBIDITY WITH BODY MASS INDEX (BMI) OF 40.0 TO 44.9 IN ADULT: ICD-10-CM

## 2025-06-24 DIAGNOSIS — Z00.00 WELL ADULT EXAM: Primary | ICD-10-CM

## 2025-06-24 DIAGNOSIS — Z13.1 DIABETES MELLITUS SCREENING: ICD-10-CM

## 2025-06-24 DIAGNOSIS — Z12.5 SCREENING PSA (PROSTATE SPECIFIC ANTIGEN): ICD-10-CM

## 2025-06-24 DIAGNOSIS — J45.20 MILD INTERMITTENT ASTHMA WITHOUT COMPLICATION: ICD-10-CM

## 2025-06-24 DIAGNOSIS — J18.9 PNEUMONIA OF LEFT LOWER LOBE DUE TO INFECTIOUS ORGANISM: ICD-10-CM

## 2025-06-24 LAB
ALBUMIN SERPL-MCNC: 4.5 G/DL (ref 3.5–5.2)
ALBUMIN/GLOB SERPL: 1.3 G/DL
ALP SERPL-CCNC: 99 U/L (ref 39–117)
ALT SERPL W P-5'-P-CCNC: 30 U/L (ref 1–41)
ANION GAP SERPL CALCULATED.3IONS-SCNC: 11.5 MMOL/L (ref 5–15)
AST SERPL-CCNC: 23 U/L (ref 1–40)
BILIRUB SERPL-MCNC: 0.4 MG/DL (ref 0–1.2)
BUN SERPL-MCNC: 14 MG/DL (ref 6–20)
BUN/CREAT SERPL: 10.9 (ref 7–25)
CALCIUM SPEC-SCNC: 9.5 MG/DL (ref 8.6–10.5)
CHLORIDE SERPL-SCNC: 104 MMOL/L (ref 98–107)
CHOLEST SERPL-MCNC: 156 MG/DL (ref 0–200)
CO2 SERPL-SCNC: 23.5 MMOL/L (ref 22–29)
CREAT SERPL-MCNC: 1.29 MG/DL (ref 0.76–1.27)
DEPRECATED RDW RBC AUTO: 41.6 FL (ref 37–54)
EGFRCR SERPLBLD CKD-EPI 2021: 66.7 ML/MIN/1.73
ERYTHROCYTE [DISTWIDTH] IN BLOOD BY AUTOMATED COUNT: 13 % (ref 12.3–15.4)
EXPIRATION DATE: ABNORMAL
EXPIRATION DATE: NORMAL
GLOBULIN UR ELPH-MCNC: 3.4 GM/DL
GLUCOSE SERPL-MCNC: 79 MG/DL (ref 65–99)
HBA1C MFR BLD: 6.2 % (ref 4.5–5.7)
HCT VFR BLD AUTO: 45.6 % (ref 37.5–51)
HDLC SERPL-MCNC: 34 MG/DL (ref 40–60)
HGB BLD-MCNC: 15.3 G/DL (ref 13–17.7)
LDLC SERPL CALC-MCNC: 99 MG/DL (ref 0–100)
LDLC/HDLC SERPL: 2.82 {RATIO}
Lab: ABNORMAL
Lab: NORMAL
MCH RBC QN AUTO: 30 PG (ref 26.6–33)
MCHC RBC AUTO-ENTMCNC: 33.6 G/DL (ref 31.5–35.7)
MCV RBC AUTO: 89.4 FL (ref 79–97)
PLATELET # BLD AUTO: 229 10*3/MM3 (ref 140–450)
PMV BLD AUTO: 9.8 FL (ref 6–12)
POC ALBUMIN, URINE: 150 MG/L
POC CREATININE, URINE: 300 MG/DL
POC URINE ALB/CREA RATIO: NORMAL
POTASSIUM SERPL-SCNC: 4.2 MMOL/L (ref 3.5–5.2)
PROT SERPL-MCNC: 7.9 G/DL (ref 6–8.5)
PSA SERPL-MCNC: 0.99 NG/ML (ref 0–4)
RBC # BLD AUTO: 5.1 10*6/MM3 (ref 4.14–5.8)
SODIUM SERPL-SCNC: 139 MMOL/L (ref 136–145)
T4 FREE SERPL-MCNC: 1.04 NG/DL (ref 0.92–1.68)
TRIGL SERPL-MCNC: 130 MG/DL (ref 0–150)
TSH SERPL DL<=0.05 MIU/L-ACNC: 1.56 UIU/ML (ref 0.27–4.2)
VLDLC SERPL-MCNC: 23 MG/DL (ref 5–40)
WBC NRBC COR # BLD AUTO: 5.58 10*3/MM3 (ref 3.4–10.8)

## 2025-06-24 PROCEDURE — 99396 PREV VISIT EST AGE 40-64: CPT | Performed by: INTERNAL MEDICINE

## 2025-06-24 PROCEDURE — 82044 UR ALBUMIN SEMIQUANTITATIVE: CPT | Performed by: INTERNAL MEDICINE

## 2025-06-24 PROCEDURE — 84439 ASSAY OF FREE THYROXINE: CPT | Performed by: INTERNAL MEDICINE

## 2025-06-24 PROCEDURE — 99214 OFFICE O/P EST MOD 30 MIN: CPT | Performed by: INTERNAL MEDICINE

## 2025-06-24 PROCEDURE — 80061 LIPID PANEL: CPT | Performed by: INTERNAL MEDICINE

## 2025-06-24 PROCEDURE — 83036 HEMOGLOBIN GLYCOSYLATED A1C: CPT | Performed by: INTERNAL MEDICINE

## 2025-06-24 PROCEDURE — 80050 GENERAL HEALTH PANEL: CPT | Performed by: INTERNAL MEDICINE

## 2025-06-24 PROCEDURE — 82570 ASSAY OF URINE CREATININE: CPT | Performed by: INTERNAL MEDICINE

## 2025-06-24 PROCEDURE — G0103 PSA SCREENING: HCPCS | Performed by: INTERNAL MEDICINE

## 2025-06-24 RX ORDER — TADALAFIL 10 MG/1
1 TABLET ORAL DAILY
COMMUNITY
Start: 2025-06-18

## 2025-06-24 RX ORDER — ALBUTEROL SULFATE 90 UG/1
2 INHALANT RESPIRATORY (INHALATION) EVERY 6 HOURS PRN
Qty: 8.5 G | Refills: 5 | Status: SHIPPED | OUTPATIENT
Start: 2025-06-24

## 2025-06-24 RX ORDER — SILDENAFIL 50 MG/1
1 TABLET, FILM COATED ORAL DAILY
COMMUNITY
Start: 2025-06-18

## 2025-06-24 RX ORDER — SEMAGLUTIDE 0.25 MG/.5ML
0.25 INJECTION, SOLUTION SUBCUTANEOUS WEEKLY
Qty: 2 ML | Refills: 2 | Status: SHIPPED | OUTPATIENT
Start: 2025-06-24

## 2025-06-24 NOTE — PROGRESS NOTES
"Chief Complaint  Annual Exam    Subjective    Pj Zuleta is a 50 y.o. male.     Pj Zuleta presents to [facility] for     History of Present Illness  The patient is a 50-year-old male who presents for a physical exam.    He has been making efforts to incorporate gym workouts into his routine, but he is uncertain about his blood sugar levels. He is considering the initiation of Wegovy injections as a potential aid. His diet is generally healthy, although he acknowledges the need for more regular meals. He has also enrolled in the [facility] weight management program.    FAMILY HISTORY  He does not have a family history of thyroid cancer.           Complete Adult Physical          Diet: regular and trying to improve his diet         Exercise: trying to exercise at least 1-2 x a week         Social History   No smoking  No ETOH  No Marijuana         Preventative Screenings  Colonoscopy is  up to date       Immunizations:         The following portions of the patient's history were reviewed and updated as appropriate: allergies, current medications, past family history, past medical history, past social history, past surgical history, and problem list.    Review of Systems   All other systems reviewed and are negative.      Objective   Body mass index is 42.8 kg/m².  Class 3 Severe Obesity (BMI >=40). Obesity-related health conditions include the following: hypertension. Obesity is improving with lifestyle modifications. BMI is is above average; BMI management plan is completed. We discussed portion control and increasing exercise.       Vital Signs:   /88 (BP Location: Right arm, Patient Position: Sitting, Cuff Size: Large Adult)   Pulse 88   Temp 98.4 °F (36.9 °C) (Temporal)   Resp 16   Ht 179.2 cm (70.55\")   Wt (!) 137 kg (303 lb)   BMI 42.80 kg/m²       Physical Exam  Patient is alert x3, in no distress.  Head is normocephalic and " atraumatic. Pupils are equal, light, accommodation. Extraocular muscles are intact. Membranes are moist. Tympanic membranes are clear bilaterally.  Neck is supple. No cervical lymphadenopathy or goiter.  Chest is clear to auscultation. No rhonchi or wheeze.  Heart sounds S1, S2. No murmurs, rubs, or gallop.  Bowel sounds are present. Abdomen is soft, no mass or tenderness.  No testicular mass and no inguinal hernia.  Pulses are +2 in lower extremities with good perfusion.  Strength is 5 out of 5 in both upper and lower extremities, proximal distribution and symmetric.  Cranial nerves are intact. Deep tendon reflexes (DTRs) are +2.  A small nevus is present on the right side upper eyelid which has changed in size and nature.       Results              Assessment and Plan  Diagnoses and all orders for this visit:  Assessment & Plan  1. Obesity and weight loss.  He has demonstrated significant motivation towards lifestyle modification, diet planning, and exercise, yet has not achieved his desired weight loss goals. Given this, he appears to be an ideal candidate for GLP-1 medications. The potential side effects and precautions associated with these medications have been thoroughly discussed. A trial of Wegovy will be initiated, starting with a dose of 0.25 mg subcutaneously on a weekly basis. His weight loss progress will be closely monitored.    2. Hypertension.  This is chronic and well-managed. He will continue with his current antihypertensive regimen, lifestyle modifications, dietary changes, and exercise routine.    3. Asthma.  This is chronic, well-managed, and currently stable. He has only required his albuterol inhaler on an as-needed basis and has not experienced any recent exacerbations.    4. Anticipatory guidance.  He is current with his colonoscopy screening. He is encouraged to maintain up-to-date vision and dental screenings.    5. Preventative maintenance.  Laboratory tests will be conducted today,  including CBC, CMP, TSH, free T4, lipid profile, PSA for prostate cancer screening, hemoglobin A1c, and urine microalbumin for proteinuria screening.    6. Nevus.  A small nevus is present on the right side upper eyelid which has changed in size and nature. A referral to dermatology will be made for further evaluation.    Follow-up  The patient will follow up in 1 year for his physical exam or sooner with any other medical issues.       .Diagnoses and all orders for this visit:    1. Well adult exam (Primary)    2. Class 3 severe obesity due to excess calories without serious comorbidity with body mass index (BMI) of 40.0 to 44.9 in adult  -     Semaglutide-Weight Management (Wegovy) 0.25 MG/0.5ML solution auto-injector; Inject 0.5 mL under the skin into the appropriate area as directed 1 (One) Time Per Week.  Dispense: 2 mL; Refill: 2    3. Primary hypertension  -     CBC (No Diff); Future  -     Comprehensive Metabolic Panel; Future  -     TSH; Future  -     T4, Free; Future  -     POC Albumin/Creatinine Ratio Urine; Future    4. Mild intermittent asthma without complication  -     albuterol sulfate  (90 Base) MCG/ACT inhaler; Inhale 2 puffs Every 6 (Six) Hours As Needed for Wheezing.  Dispense: 8.5 g; Refill: 5    5. Lipid screening  -     Lipid Panel; Future    6. Screening PSA (prostate specific antigen)  -     PSA Screen; Future    7. Diabetes mellitus screening  -     POC Glycosylated Hemoglobin (Hb A1C); Future    8. Pneumonia of left lower lobe due to infectious organism  -     albuterol sulfate  (90 Base) MCG/ACT inhaler; Inhale 2 puffs Every 6 (Six) Hours As Needed for Wheezing.  Dispense: 8.5 g; Refill: 5    9. Nevus  -     Ambulatory Referral to Dermatology              Follow Up   Return in about 1 year (around 6/24/2026) for Annual physical.  Patient was given instructions and counseling regarding his condition or for health maintenance advice. Please see specific information pulled into  the AVS if appropriate.     Patient or patient representative verbalized consent for the use of Ambient Listening during the visit with  Carl Chiang MD for chart documentation. 6/24/2025  12:07 EDT

## 2025-06-25 ENCOUNTER — TELEPHONE (OUTPATIENT)
Dept: INTERNAL MEDICINE | Facility: CLINIC | Age: 50
End: 2025-06-25
Payer: COMMERCIAL

## 2025-06-25 NOTE — TELEPHONE ENCOUNTER
Your prior authorization for Wegovy has been approved!    Called and notified patient that PA has been approved and that he can  from pharmacy. Good verb given.

## 2025-06-26 ENCOUNTER — RESULTS FOLLOW-UP (OUTPATIENT)
Dept: INTERNAL MEDICINE | Facility: CLINIC | Age: 50
End: 2025-06-26
Payer: COMMERCIAL